# Patient Record
Sex: MALE | Race: ASIAN | NOT HISPANIC OR LATINO | Employment: FULL TIME | URBAN - METROPOLITAN AREA
[De-identification: names, ages, dates, MRNs, and addresses within clinical notes are randomized per-mention and may not be internally consistent; named-entity substitution may affect disease eponyms.]

---

## 2019-03-11 ENCOUNTER — HOSPITAL ENCOUNTER (INPATIENT)
Facility: HOSPITAL | Age: 44
LOS: 4 days | Discharge: HOME | DRG: 438 | End: 2019-03-15
Attending: EMERGENCY MEDICINE | Admitting: HOSPITALIST
Payer: COMMERCIAL

## 2019-03-11 DIAGNOSIS — K85.90 ACUTE PANCREATITIS, UNSPECIFIED COMPLICATION STATUS, UNSPECIFIED PANCREATITIS TYPE: Primary | ICD-10-CM

## 2019-03-11 DIAGNOSIS — R74.01 TRANSAMINITIS: ICD-10-CM

## 2019-03-11 DIAGNOSIS — E78.2 MIXED HYPERLIPIDEMIA: ICD-10-CM

## 2019-03-11 DIAGNOSIS — E86.9 VOLUME DEPLETION: ICD-10-CM

## 2019-03-11 DIAGNOSIS — R73.9 HYPERGLYCEMIA: ICD-10-CM

## 2019-03-11 DIAGNOSIS — E11.9 NEW ONSET TYPE 2 DIABETES MELLITUS: ICD-10-CM

## 2019-03-11 DIAGNOSIS — K76.0 HEPATIC STEATOSIS: ICD-10-CM

## 2019-03-11 DIAGNOSIS — R11.0 NAUSEA: ICD-10-CM

## 2019-03-11 DIAGNOSIS — N17.9 AKI (ACUTE KIDNEY INJURY): ICD-10-CM

## 2019-03-11 DIAGNOSIS — R10.10 UPPER ABDOMINAL PAIN: ICD-10-CM

## 2019-03-11 DIAGNOSIS — E11.00 UNCONTROLLED TYPE 2 DIABETES MELLITUS WITH HYPEROSMOLAR NONKETOTIC HYPERGLYCEMIA: ICD-10-CM

## 2019-03-11 DIAGNOSIS — K85.80 OTHER ACUTE PANCREATITIS WITHOUT INFECTION OR NECROSIS: ICD-10-CM

## 2019-03-11 DIAGNOSIS — R10.9 ABDOMINAL PAIN, UNSPECIFIED ABDOMINAL LOCATION: ICD-10-CM

## 2019-03-11 LAB
ALBUMIN SERPL BCP-MCNC: 3.5 G/DL
ALLENS TEST: ABNORMAL
ALP SERPL-CCNC: 93 U/L
ALT SERPL W/O P-5'-P-CCNC: 44 U/L
ANION GAP SERPL CALC-SCNC: 12 MMOL/L
AST SERPL-CCNC: 14 U/L
B-OH-BUTYR BLD STRIP-SCNC: 0.3 MMOL/L
BACTERIA #/AREA URNS AUTO: NORMAL /HPF
BASOPHILS # BLD AUTO: 0.03 K/UL
BASOPHILS NFR BLD: 0.3 %
BILIRUB SERPL-MCNC: 0.9 MG/DL
BILIRUB UR QL STRIP: NEGATIVE
BUN SERPL-MCNC: 7 MG/DL
CALCIUM SERPL-MCNC: 9.7 MG/DL
CHLORIDE SERPL-SCNC: 94 MMOL/L
CLARITY UR REFRACT.AUTO: CLEAR
CO2 SERPL-SCNC: 23 MMOL/L
COLOR UR AUTO: COLORLESS
CREAT SERPL-MCNC: 1.5 MG/DL
DELSYS: ABNORMAL
DIFFERENTIAL METHOD: ABNORMAL
EOSINOPHIL # BLD AUTO: 0.1 K/UL
EOSINOPHIL NFR BLD: 0.5 %
ERYTHROCYTE [DISTWIDTH] IN BLOOD BY AUTOMATED COUNT: 13.2 %
EST. GFR  (AFRICAN AMERICAN): >60 ML/MIN/1.73 M^2
EST. GFR  (NON AFRICAN AMERICAN): 56.2 ML/MIN/1.73 M^2
ESTIMATED AVG GLUCOSE: 260 MG/DL
GLUCOSE SERPL-MCNC: 279 MG/DL (ref 70–110)
GLUCOSE SERPL-MCNC: 704 MG/DL
GLUCOSE UR QL STRIP: ABNORMAL
HBA1C MFR BLD HPLC: 10.7 %
HCO3 UR-SCNC: 21.7 MMOL/L (ref 24–28)
HCT VFR BLD AUTO: 46.1 %
HGB BLD-MCNC: 15.1 G/DL
HGB UR QL STRIP: ABNORMAL
IMM GRANULOCYTES # BLD AUTO: 0.07 K/UL
IMM GRANULOCYTES NFR BLD AUTO: 0.7 %
KETONES UR QL STRIP: NEGATIVE
LEUKOCYTE ESTERASE UR QL STRIP: NEGATIVE
LIPASE SERPL-CCNC: 26 U/L
LYMPHOCYTES # BLD AUTO: 1.1 K/UL
LYMPHOCYTES NFR BLD: 11.3 %
MCH RBC QN AUTO: 28.4 PG
MCHC RBC AUTO-ENTMCNC: 32.8 G/DL
MCV RBC AUTO: 87 FL
MICROSCOPIC COMMENT: NORMAL
MODE: ABNORMAL
MONOCYTES # BLD AUTO: 0.8 K/UL
MONOCYTES NFR BLD: 8.3 %
NEUTROPHILS # BLD AUTO: 7.9 K/UL
NEUTROPHILS NFR BLD: 78.9 %
NITRITE UR QL STRIP: NEGATIVE
NRBC BLD-RTO: 0 /100 WBC
OSMOLALITY SERPL: 310 MOSM/KG
OSMOLALITY UR: 286 MOSM/KG
PCO2 BLDA: 32 MMHG (ref 35–45)
PH SMN: 7.44 [PH] (ref 7.35–7.45)
PH UR STRIP: 7 [PH] (ref 5–8)
PLATELET # BLD AUTO: 165 K/UL
PMV BLD AUTO: 11.2 FL
PO2 BLDA: 41 MMHG (ref 40–60)
POC BE: -2 MMOL/L
POC SATURATED O2: 79 % (ref 95–100)
POC TCO2: 23 MMOL/L (ref 24–29)
POTASSIUM SERPL-SCNC: 3.9 MMOL/L
PROT SERPL-MCNC: 8.1 G/DL
PROT UR QL STRIP: NEGATIVE
RBC # BLD AUTO: 5.31 M/UL
RBC #/AREA URNS AUTO: 3 /HPF (ref 0–4)
SAMPLE: ABNORMAL
SITE: ABNORMAL
SODIUM SERPL-SCNC: 129 MMOL/L
SP GR UR STRIP: 1.01 (ref 1–1.03)
URN SPEC COLLECT METH UR: ABNORMAL
WBC # BLD AUTO: 9.99 K/UL
YEAST UR QL AUTO: NORMAL

## 2019-03-11 PROCEDURE — 83930 ASSAY OF BLOOD OSMOLALITY: CPT

## 2019-03-11 PROCEDURE — 99291 CRITICAL CARE FIRST HOUR: CPT | Mod: ,,, | Performed by: EMERGENCY MEDICINE

## 2019-03-11 PROCEDURE — 82010 KETONE BODYS QUAN: CPT

## 2019-03-11 PROCEDURE — 96372 THER/PROPH/DIAG INJ SC/IM: CPT | Mod: 59

## 2019-03-11 PROCEDURE — 96361 HYDRATE IV INFUSION ADD-ON: CPT

## 2019-03-11 PROCEDURE — 99223 1ST HOSP IP/OBS HIGH 75: CPT | Mod: ,,, | Performed by: HOSPITALIST

## 2019-03-11 PROCEDURE — 83935 ASSAY OF URINE OSMOLALITY: CPT

## 2019-03-11 PROCEDURE — 85025 COMPLETE CBC W/AUTO DIFF WBC: CPT

## 2019-03-11 PROCEDURE — 25000003 PHARM REV CODE 250: Performed by: EMERGENCY MEDICINE

## 2019-03-11 PROCEDURE — 63600175 PHARM REV CODE 636 W HCPCS: Performed by: EMERGENCY MEDICINE

## 2019-03-11 PROCEDURE — 96374 THER/PROPH/DIAG INJ IV PUSH: CPT

## 2019-03-11 PROCEDURE — 82803 BLOOD GASES ANY COMBINATION: CPT

## 2019-03-11 PROCEDURE — 80053 COMPREHEN METABOLIC PANEL: CPT

## 2019-03-11 PROCEDURE — 84100 ASSAY OF PHOSPHORUS: CPT

## 2019-03-11 PROCEDURE — 12000002 HC ACUTE/MED SURGE SEMI-PRIVATE ROOM

## 2019-03-11 PROCEDURE — 99291 PR CRITICAL CARE, E/M 30-74 MINUTES: ICD-10-PCS | Mod: ,,, | Performed by: EMERGENCY MEDICINE

## 2019-03-11 PROCEDURE — 99900035 HC TECH TIME PER 15 MIN (STAT)

## 2019-03-11 PROCEDURE — 96375 TX/PRO/DX INJ NEW DRUG ADDON: CPT

## 2019-03-11 PROCEDURE — 99285 EMERGENCY DEPT VISIT HI MDM: CPT

## 2019-03-11 PROCEDURE — 25500020 PHARM REV CODE 255: Performed by: EMERGENCY MEDICINE

## 2019-03-11 PROCEDURE — 83036 HEMOGLOBIN GLYCOSYLATED A1C: CPT

## 2019-03-11 PROCEDURE — 81001 URINALYSIS AUTO W/SCOPE: CPT

## 2019-03-11 PROCEDURE — 96376 TX/PRO/DX INJ SAME DRUG ADON: CPT

## 2019-03-11 PROCEDURE — 83735 ASSAY OF MAGNESIUM: CPT

## 2019-03-11 PROCEDURE — 99291 CRITICAL CARE FIRST HOUR: CPT

## 2019-03-11 PROCEDURE — 83690 ASSAY OF LIPASE: CPT

## 2019-03-11 PROCEDURE — 99223 PR INITIAL HOSPITAL CARE,LEVL III: ICD-10-PCS | Mod: ,,, | Performed by: HOSPITALIST

## 2019-03-11 RX ORDER — AMOXICILLIN 250 MG
2 CAPSULE ORAL 2 TIMES DAILY PRN
Status: DISCONTINUED | OUTPATIENT
Start: 2019-03-12 | End: 2019-03-15 | Stop reason: HOSPADM

## 2019-03-11 RX ORDER — HYDROMORPHONE HYDROCHLORIDE 1 MG/ML
1 INJECTION, SOLUTION INTRAMUSCULAR; INTRAVENOUS; SUBCUTANEOUS
Status: COMPLETED | OUTPATIENT
Start: 2019-03-11 | End: 2019-03-11

## 2019-03-11 RX ORDER — ONDANSETRON 2 MG/ML
4 INJECTION INTRAMUSCULAR; INTRAVENOUS EVERY 6 HOURS PRN
Status: DISCONTINUED | OUTPATIENT
Start: 2019-03-12 | End: 2019-03-14

## 2019-03-11 RX ORDER — MORPHINE SULFATE 4 MG/ML
4 INJECTION, SOLUTION INTRAMUSCULAR; INTRAVENOUS EVERY 4 HOURS PRN
Status: DISCONTINUED | OUTPATIENT
Start: 2019-03-12 | End: 2019-03-12

## 2019-03-11 RX ORDER — POTASSIUM CHLORIDE 20 MEQ/1
20 TABLET, EXTENDED RELEASE ORAL ONCE
Status: DISCONTINUED | OUTPATIENT
Start: 2019-03-12 | End: 2019-03-11

## 2019-03-11 RX ORDER — ACETAMINOPHEN 325 MG/1
650 TABLET ORAL EVERY 6 HOURS PRN
Status: DISCONTINUED | OUTPATIENT
Start: 2019-03-12 | End: 2019-03-15 | Stop reason: HOSPADM

## 2019-03-11 RX ORDER — ONDANSETRON 2 MG/ML
4 INJECTION INTRAMUSCULAR; INTRAVENOUS
Status: COMPLETED | OUTPATIENT
Start: 2019-03-11 | End: 2019-03-11

## 2019-03-11 RX ORDER — METOCLOPRAMIDE HYDROCHLORIDE 5 MG/ML
10 INJECTION INTRAMUSCULAR; INTRAVENOUS
Status: COMPLETED | OUTPATIENT
Start: 2019-03-11 | End: 2019-03-11

## 2019-03-11 RX ORDER — OXYCODONE HYDROCHLORIDE 5 MG/1
5 TABLET ORAL EVERY 4 HOURS PRN
Status: DISCONTINUED | OUTPATIENT
Start: 2019-03-12 | End: 2019-03-12

## 2019-03-11 RX ORDER — POTASSIUM CHLORIDE 20 MEQ/1
40 TABLET, EXTENDED RELEASE ORAL ONCE
Status: COMPLETED | OUTPATIENT
Start: 2019-03-12 | End: 2019-03-12

## 2019-03-11 RX ADMIN — HYDROMORPHONE HYDROCHLORIDE 1 MG: 1 INJECTION, SOLUTION INTRAMUSCULAR; INTRAVENOUS; SUBCUTANEOUS at 08:03

## 2019-03-11 RX ADMIN — HYDROMORPHONE HYDROCHLORIDE 1 MG: 1 INJECTION, SOLUTION INTRAMUSCULAR; INTRAVENOUS; SUBCUTANEOUS at 10:03

## 2019-03-11 RX ADMIN — ONDANSETRON 4 MG: 2 INJECTION INTRAMUSCULAR; INTRAVENOUS at 08:03

## 2019-03-11 RX ADMIN — INSULIN HUMAN 5 UNITS: 100 INJECTION, SOLUTION PARENTERAL at 10:03

## 2019-03-11 RX ADMIN — SODIUM CHLORIDE, SODIUM LACTATE, POTASSIUM CHLORIDE, AND CALCIUM CHLORIDE 1000 ML: .6; .31; .03; .02 INJECTION, SOLUTION INTRAVENOUS at 08:03

## 2019-03-11 RX ADMIN — IOHEXOL 100 ML: 350 INJECTION, SOLUTION INTRAVENOUS at 10:03

## 2019-03-11 RX ADMIN — SODIUM CHLORIDE, SODIUM LACTATE, POTASSIUM CHLORIDE, AND CALCIUM CHLORIDE 1000 ML: .6; .31; .03; .02 INJECTION, SOLUTION INTRAVENOUS at 10:03

## 2019-03-11 RX ADMIN — METOCLOPRAMIDE 10 MG: 5 INJECTION, SOLUTION INTRAMUSCULAR; INTRAVENOUS at 10:03

## 2019-03-12 PROBLEM — R11.0 NAUSEA: Status: ACTIVE | Noted: 2019-03-12

## 2019-03-12 PROBLEM — R10.10 UPPER ABDOMINAL PAIN: Status: ACTIVE | Noted: 2019-03-12

## 2019-03-12 PROBLEM — E11.9 NEW ONSET TYPE 2 DIABETES MELLITUS: Status: ACTIVE | Noted: 2019-03-12

## 2019-03-12 PROBLEM — E78.2 MIXED HYPERLIPIDEMIA: Status: ACTIVE | Noted: 2019-03-12

## 2019-03-12 PROBLEM — E11.00 UNCONTROLLED TYPE 2 DIABETES MELLITUS WITH HYPEROSMOLAR NONKETOTIC HYPERGLYCEMIA: Status: ACTIVE | Noted: 2019-03-12

## 2019-03-12 PROBLEM — E86.9 VOLUME DEPLETION: Status: ACTIVE | Noted: 2019-03-12

## 2019-03-12 PROBLEM — K85.90 ACUTE PANCREATITIS WITHOUT INFECTION OR NECROSIS: Status: ACTIVE | Noted: 2019-03-12

## 2019-03-12 PROBLEM — K76.0 HEPATIC STEATOSIS: Status: ACTIVE | Noted: 2019-03-12

## 2019-03-12 PROBLEM — N17.9 AKI (ACUTE KIDNEY INJURY): Status: ACTIVE | Noted: 2019-03-12

## 2019-03-12 LAB
ALBUMIN SERPL BCP-MCNC: 2.9 G/DL
ALP SERPL-CCNC: 87 U/L
ALT SERPL W/O P-5'-P-CCNC: 32 U/L
ANION GAP SERPL CALC-SCNC: 8 MMOL/L
AST SERPL-CCNC: 19 U/L
BASOPHILS # BLD AUTO: 0.02 K/UL
BASOPHILS NFR BLD: 0.2 %
BILIRUB SERPL-MCNC: 0.9 MG/DL
BUN SERPL-MCNC: 5 MG/DL
CALCIUM SERPL-MCNC: 8.5 MG/DL
CHLORIDE SERPL-SCNC: 104 MMOL/L
CHOLEST SERPL-MCNC: 250 MG/DL
CHOLEST/HDLC SERPL: 10.4 {RATIO}
CO2 SERPL-SCNC: 24 MMOL/L
CREAT SERPL-MCNC: 0.8 MG/DL
DIFFERENTIAL METHOD: ABNORMAL
EOSINOPHIL # BLD AUTO: 0.1 K/UL
EOSINOPHIL NFR BLD: 1.1 %
ERYTHROCYTE [DISTWIDTH] IN BLOOD BY AUTOMATED COUNT: 13.2 %
EST. GFR  (AFRICAN AMERICAN): >60 ML/MIN/1.73 M^2
EST. GFR  (NON AFRICAN AMERICAN): >60 ML/MIN/1.73 M^2
GLUCOSE SERPL-MCNC: 197 MG/DL
GLUCOSE SERPL-MCNC: 249 MG/DL (ref 70–110)
HCT VFR BLD AUTO: 40 %
HDLC SERPL-MCNC: 24 MG/DL
HDLC SERPL: 9.6 %
HGB BLD-MCNC: 13.4 G/DL
IMM GRANULOCYTES # BLD AUTO: 0.04 K/UL
IMM GRANULOCYTES NFR BLD AUTO: 0.4 %
LDLC SERPL CALC-MCNC: ABNORMAL MG/DL
LIPASE SERPL-CCNC: 20 U/L
LYMPHOCYTES # BLD AUTO: 1.7 K/UL
LYMPHOCYTES NFR BLD: 19.1 %
MAGNESIUM SERPL-MCNC: 2 MG/DL
MAGNESIUM SERPL-MCNC: 2.5 MG/DL
MCH RBC QN AUTO: 29.2 PG
MCHC RBC AUTO-ENTMCNC: 33.5 G/DL
MCV RBC AUTO: 87 FL
MONOCYTES # BLD AUTO: 0.8 K/UL
MONOCYTES NFR BLD: 9.1 %
NEUTROPHILS # BLD AUTO: 6.3 K/UL
NEUTROPHILS NFR BLD: 70.1 %
NONHDLC SERPL-MCNC: 226 MG/DL
NRBC BLD-RTO: 0 /100 WBC
PHOSPHATE SERPL-MCNC: 2.2 MG/DL
PHOSPHATE SERPL-MCNC: 2.2 MG/DL
PLATELET # BLD AUTO: 150 K/UL
PMV BLD AUTO: 11.7 FL
POCT GLUCOSE: 206 MG/DL (ref 70–110)
POCT GLUCOSE: 245 MG/DL (ref 70–110)
POCT GLUCOSE: 246 MG/DL (ref 70–110)
POCT GLUCOSE: 255 MG/DL (ref 70–110)
POCT GLUCOSE: 279 MG/DL (ref 70–110)
POTASSIUM SERPL-SCNC: 3.2 MMOL/L
PROT SERPL-MCNC: 6.6 G/DL
RBC # BLD AUTO: 4.59 M/UL
SODIUM SERPL-SCNC: 136 MMOL/L
TRIGL SERPL-MCNC: 449 MG/DL
TRIGL SERPL-MCNC: 449 MG/DL
WBC # BLD AUTO: 8.97 K/UL

## 2019-03-12 PROCEDURE — 80061 LIPID PANEL: CPT

## 2019-03-12 PROCEDURE — 25000003 PHARM REV CODE 250: Performed by: HOSPITALIST

## 2019-03-12 PROCEDURE — 63600175 PHARM REV CODE 636 W HCPCS: Performed by: HOSPITALIST

## 2019-03-12 PROCEDURE — 80053 COMPREHEN METABOLIC PANEL: CPT

## 2019-03-12 PROCEDURE — 99232 SBSQ HOSP IP/OBS MODERATE 35: CPT | Mod: ,,, | Performed by: HOSPITALIST

## 2019-03-12 PROCEDURE — 11000001 HC ACUTE MED/SURG PRIVATE ROOM

## 2019-03-12 PROCEDURE — 86337 INSULIN ANTIBODIES: CPT

## 2019-03-12 PROCEDURE — 83690 ASSAY OF LIPASE: CPT

## 2019-03-12 PROCEDURE — 85025 COMPLETE CBC W/AUTO DIFF WBC: CPT

## 2019-03-12 PROCEDURE — 86341 ISLET CELL ANTIBODY: CPT

## 2019-03-12 PROCEDURE — S5571 INSULIN DISPOS PEN 3 ML: HCPCS | Performed by: HOSPITALIST

## 2019-03-12 PROCEDURE — 25000003 PHARM REV CODE 250: Performed by: EMERGENCY MEDICINE

## 2019-03-12 PROCEDURE — 83735 ASSAY OF MAGNESIUM: CPT

## 2019-03-12 PROCEDURE — 99232 PR SUBSEQUENT HOSPITAL CARE,LEVL II: ICD-10-PCS | Mod: ,,, | Performed by: HOSPITALIST

## 2019-03-12 PROCEDURE — 84100 ASSAY OF PHOSPHORUS: CPT

## 2019-03-12 RX ORDER — INSULIN ASPART 100 [IU]/ML
1-10 INJECTION, SOLUTION INTRAVENOUS; SUBCUTANEOUS EVERY 6 HOURS PRN
Status: DISCONTINUED | OUTPATIENT
Start: 2019-03-12 | End: 2019-03-15 | Stop reason: HOSPADM

## 2019-03-12 RX ORDER — HYDROMORPHONE HYDROCHLORIDE 1 MG/ML
1 INJECTION, SOLUTION INTRAMUSCULAR; INTRAVENOUS; SUBCUTANEOUS
Status: DISCONTINUED | OUTPATIENT
Start: 2019-03-12 | End: 2019-03-12

## 2019-03-12 RX ORDER — ATORVASTATIN CALCIUM 20 MG/1
40 TABLET, FILM COATED ORAL DAILY
Status: DISCONTINUED | OUTPATIENT
Start: 2019-03-12 | End: 2019-03-14

## 2019-03-12 RX ORDER — ENOXAPARIN SODIUM 100 MG/ML
40 INJECTION SUBCUTANEOUS
Status: DISCONTINUED | OUTPATIENT
Start: 2019-03-12 | End: 2019-03-15 | Stop reason: HOSPADM

## 2019-03-12 RX ORDER — HYDROMORPHONE HYDROCHLORIDE 1 MG/ML
1 INJECTION, SOLUTION INTRAMUSCULAR; INTRAVENOUS; SUBCUTANEOUS EVERY 4 HOURS PRN
Status: DISCONTINUED | OUTPATIENT
Start: 2019-03-12 | End: 2019-03-14

## 2019-03-12 RX ORDER — ALLOPURINOL 100 MG/1
100 TABLET ORAL DAILY
Status: DISCONTINUED | OUTPATIENT
Start: 2019-03-12 | End: 2019-03-15 | Stop reason: HOSPADM

## 2019-03-12 RX ORDER — HYDROMORPHONE HYDROCHLORIDE 1 MG/ML
2 INJECTION, SOLUTION INTRAMUSCULAR; INTRAVENOUS; SUBCUTANEOUS EVERY 4 HOURS PRN
Status: DISCONTINUED | OUTPATIENT
Start: 2019-03-12 | End: 2019-03-14

## 2019-03-12 RX ORDER — SODIUM CHLORIDE 0.9 % (FLUSH) 0.9 %
5 SYRINGE (ML) INJECTION
Status: DISCONTINUED | OUTPATIENT
Start: 2019-03-12 | End: 2019-03-15 | Stop reason: HOSPADM

## 2019-03-12 RX ORDER — SODIUM CHLORIDE, SODIUM LACTATE, POTASSIUM CHLORIDE, CALCIUM CHLORIDE 600; 310; 30; 20 MG/100ML; MG/100ML; MG/100ML; MG/100ML
INJECTION, SOLUTION INTRAVENOUS CONTINUOUS
Status: DISCONTINUED | OUTPATIENT
Start: 2019-03-12 | End: 2019-03-13

## 2019-03-12 RX ORDER — HYDROMORPHONE HYDROCHLORIDE 1 MG/ML
1 INJECTION, SOLUTION INTRAMUSCULAR; INTRAVENOUS; SUBCUTANEOUS
Status: COMPLETED | OUTPATIENT
Start: 2019-03-12 | End: 2019-03-12

## 2019-03-12 RX ORDER — SODIUM CHLORIDE 9 MG/ML
INJECTION, SOLUTION INTRAVENOUS CONTINUOUS
Status: DISCONTINUED | OUTPATIENT
Start: 2019-03-12 | End: 2019-03-12

## 2019-03-12 RX ORDER — ONDANSETRON 4 MG/1
4 TABLET, ORALLY DISINTEGRATING ORAL EVERY 8 HOURS PRN
Status: DISCONTINUED | OUTPATIENT
Start: 2019-03-12 | End: 2019-03-15 | Stop reason: HOSPADM

## 2019-03-12 RX ORDER — RAMELTEON 8 MG/1
8 TABLET ORAL NIGHTLY PRN
Status: DISCONTINUED | OUTPATIENT
Start: 2019-03-12 | End: 2019-03-15 | Stop reason: HOSPADM

## 2019-03-12 RX ORDER — GLUCAGON 1 MG
1 KIT INJECTION
Status: DISCONTINUED | OUTPATIENT
Start: 2019-03-12 | End: 2019-03-15 | Stop reason: HOSPADM

## 2019-03-12 RX ORDER — HYDROMORPHONE HYDROCHLORIDE 1 MG/ML
1 INJECTION, SOLUTION INTRAMUSCULAR; INTRAVENOUS; SUBCUTANEOUS EVERY 4 HOURS PRN
Status: DISCONTINUED | OUTPATIENT
Start: 2019-03-12 | End: 2019-03-12

## 2019-03-12 RX ORDER — POLYETHYLENE GLYCOL 3350 17 G/17G
17 POWDER, FOR SOLUTION ORAL 2 TIMES DAILY PRN
Status: DISCONTINUED | OUTPATIENT
Start: 2019-03-12 | End: 2019-03-15 | Stop reason: HOSPADM

## 2019-03-12 RX ORDER — HYDROMORPHONE HYDROCHLORIDE 1 MG/ML
0.5 INJECTION, SOLUTION INTRAMUSCULAR; INTRAVENOUS; SUBCUTANEOUS EVERY 4 HOURS PRN
Status: DISCONTINUED | OUTPATIENT
Start: 2019-03-12 | End: 2019-03-12

## 2019-03-12 RX ORDER — ONDANSETRON 2 MG/ML
4 INJECTION INTRAMUSCULAR; INTRAVENOUS EVERY 8 HOURS PRN
Status: DISCONTINUED | OUTPATIENT
Start: 2019-03-12 | End: 2019-03-15 | Stop reason: HOSPADM

## 2019-03-12 RX ADMIN — ENOXAPARIN SODIUM 40 MG: 100 INJECTION SUBCUTANEOUS at 05:03

## 2019-03-12 RX ADMIN — SODIUM CHLORIDE, SODIUM LACTATE, POTASSIUM CHLORIDE, AND CALCIUM CHLORIDE: .6; .31; .03; .02 INJECTION, SOLUTION INTRAVENOUS at 03:03

## 2019-03-12 RX ADMIN — ATORVASTATIN CALCIUM 40 MG: 10 TABLET, FILM COATED ORAL at 11:03

## 2019-03-12 RX ADMIN — MORPHINE SULFATE 4 MG: 4 INJECTION INTRAVENOUS at 01:03

## 2019-03-12 RX ADMIN — HYDROMORPHONE HYDROCHLORIDE 0.5 MG: 1 INJECTION, SOLUTION INTRAMUSCULAR; INTRAVENOUS; SUBCUTANEOUS at 06:03

## 2019-03-12 RX ADMIN — SODIUM CHLORIDE: 0.9 INJECTION, SOLUTION INTRAVENOUS at 01:03

## 2019-03-12 RX ADMIN — POTASSIUM CHLORIDE 40 MEQ: 1500 TABLET, EXTENDED RELEASE ORAL at 12:03

## 2019-03-12 RX ADMIN — HYDROMORPHONE HYDROCHLORIDE 2 MG: 1 INJECTION, SOLUTION INTRAMUSCULAR; INTRAVENOUS; SUBCUTANEOUS at 08:03

## 2019-03-12 RX ADMIN — ALLOPURINOL 100 MG: 100 TABLET ORAL at 08:03

## 2019-03-12 RX ADMIN — HYDROMORPHONE HYDROCHLORIDE 2 MG: 1 INJECTION, SOLUTION INTRAMUSCULAR; INTRAVENOUS; SUBCUTANEOUS at 03:03

## 2019-03-12 RX ADMIN — INSULIN DETEMIR 15 UNITS: 100 INJECTION, SOLUTION SUBCUTANEOUS at 12:03

## 2019-03-12 RX ADMIN — INSULIN ASPART 4 UNITS: 100 INJECTION, SOLUTION INTRAVENOUS; SUBCUTANEOUS at 11:03

## 2019-03-12 RX ADMIN — SODIUM CHLORIDE: 0.9 INJECTION, SOLUTION INTRAVENOUS at 06:03

## 2019-03-12 RX ADMIN — HYDROMORPHONE HYDROCHLORIDE 1 MG: 1 INJECTION, SOLUTION INTRAMUSCULAR; INTRAVENOUS; SUBCUTANEOUS at 11:03

## 2019-03-12 RX ADMIN — SODIUM CHLORIDE, SODIUM LACTATE, POTASSIUM CHLORIDE, AND CALCIUM CHLORIDE 1000 ML: .6; .31; .03; .02 INJECTION, SOLUTION INTRAVENOUS at 12:03

## 2019-03-12 RX ADMIN — INSULIN DETEMIR 15 UNITS: 100 INJECTION, SOLUTION SUBCUTANEOUS at 08:03

## 2019-03-12 RX ADMIN — HYDROMORPHONE HYDROCHLORIDE 1 MG: 1 INJECTION, SOLUTION INTRAMUSCULAR; INTRAVENOUS; SUBCUTANEOUS at 03:03

## 2019-03-12 NOTE — PROVIDER PROGRESS NOTES - EMERGENCY DEPT.
Encounter Date: 3/11/2019    ED Physician Progress Notes        Physician Note:   Time patient was seen by the provider: 8:22 PM      The patient is a 43 y.o. male who presents to the ED with a complaint of epigastric abdominal pain radiating to left back, worsened over last few days. Patient was diagnosed with pancreatitis at an ER in [place] 2 days ago.     Will do a CT. Move him to main ER. He will need to be NPO. Will control pain with dilaudid.       I, Gina Garcia, am scribing for, and in the presence of, Dr. Monsivais. I performed the above scribed service and the documentation accurately describes the services I performed. I attest to the accuracy of the note.

## 2019-03-12 NOTE — ED NOTES
Rounding  There is no change in patient status at this time and is resting in bed with side rails up, moderate pain noted, MD aware, medications given.  Patient denies chest pain and shortness of breath. Respirations are even and unlabored with equal chest rise and fall. Patient turned onto right side. Patient given plan of care update.   All IV Sites are of normal skin temperature and are free from any pain, redness, swelling at this time. Pulses are palpable distal to all sites with normal skin temperature and sensation noted for patients current condition and situation.  Safety Check  Bed locked and low, call bell within reach, side rails up X 2. Necessary monitoring equipment remains properly attached. Belongings remain within patients reach. Patient denies any needs at this time, advised and agrees to call with any needs or changes.

## 2019-03-12 NOTE — ED NOTES
Rounding  Patient complaining of increased pain, MD aware, awaiting orders.  Patient denies chest pain and shortness of breath. Respirations are even and unlabored with equal chest rise and fall. Patient turned onto right side. Patient given plan of care update.   All IV Sites are of normal skin temperature and are free from any pain, redness, swelling at this time. Pulses are palpable distal to all sites with normal skin temperature and sensation noted for patients current condition and situation.  Safety Check  Bed locked and low, call bell within reach, side rails up X 2. Necessary monitoring equipment remains properly attached. Belongings remain within patients reach. Patient denies any needs at this time, advised and agrees to call with any needs or changes.

## 2019-03-12 NOTE — ED NOTES
Rounding  Pain improved. Pt appears to be resting comfortably, asleep at this time.  Respirations are even and unlabored with equal chest rise and fall. Patient turned onto right side. Patient given plan of care update.   All IV Sites are of normal skin temperature and are free from any pain, redness, swelling at this time. Pulses are palpable distal to all sites with normal skin temperature and sensation noted for patients current condition and situation.  Safety Check  Bed locked and low, call bell within reach, side rails up X 2. Necessary monitoring equipment remains properly attached. Belongings remain within patients reach. Patient denies any needs at this time, advised and agrees to call with any needs or changes.

## 2019-03-12 NOTE — H&P
[facility] Hospital Medicine  History & Physical    Patient Name: Elliott Riggins  MRN: [id number]  Admission Date: 3/11/2019  Attending Physician: Chano Altamirano MD   Primary Care Provider: Primary Doctor [facility] Medicine Team: Networked reference to record PCT  Amna Helm DO     Patient information was obtained from patient and ER records.     Subjective:     Principal Problem:Acute pancreatitis without infection or necrosis    Chief Complaint:   Chief Complaint   Patient presents with    Abdominal Pain     c/o abd pain x 1 month that has gotten worse over the last week. Was dx with mild pancreatitis and discharged from an ED in [place] on Saturday.         HPI:     Patient is a 43 year old male with PMH of gout on allopurinol presented to ED with complaint of worsening abdominal pain for last 4 days. States he has been having intermittent indigestion with abdominal discomfort and bloating for last one month. He came to [place], [place] last week from [place] to attend a wedding. But developed severe epigastric, RUQ abdominal pain radiating to back on Friday associated with nausea but no vomiting. He went to ED at [facility] Saturday where CT abdomen reportedly showed pancreatitis. States he was given IVF and he left ED to attend wedding Saturday evening. He drove to [facility] next day to visit family. He continues to have abdominal pain with inability to tolerate PO intake and thus decided to come to ED. Also reports polyuria, polydipsia for last few days. He is found to have HHS with glucose ~700 but not in DKA. CT abdomen showed acute uncomplicated pancreatitis over tail and body area. Denies fever,chills, alcohol intake, tobacco abuse, prior history of pancreatitis or gallstone, diabetes.     His glucose improved to 280 after 3L IVF and insulin 5 units IVP. Abdominal pain improved with dilaudid given earlier but now feels like pain is coming back.           Past Medical History:    Diagnosis Date    Gout     Pancreatitis        Past Surgical History:   Procedure Laterality Date    Right ankle surgery  Right        Review of patient's allergies indicates:   Allergen Reactions    Nsaids (non-steroidal anti-inflammatory drug) Anaphylaxis       No current facility-administered medications on file prior to encounter.      Current Outpatient Medications on File Prior to Encounter   Medication Sig    ALLOPURINOL ORAL Take by mouth.    finasteride (PROSCAR ORAL) Take by mouth.     Family History : Mother with DM    None        Tobacco Use    Smoking status: Never Smoker   Substance and Sexual Activity    Alcohol use: No     Frequency: Never    Drug use: No     Comment: Lives with wife     Sexual activity: Yes     Partners: Female     Review of Systems   Constitutional: Negative for activity change, appetite change, chills, diaphoresis, fatigue, fever and unexpected weight change.   HENT: Negative for congestion, dental problem, drooling, ear discharge, ear pain, facial swelling, hearing loss, mouth sores, nosebleeds, postnasal drip, rhinorrhea, sinus pressure, sneezing, sore throat, tinnitus, trouble swallowing and voice change.    Eyes: Negative for photophobia, pain, discharge, redness, itching and visual disturbance.   Respiratory: Negative for apnea, cough, choking, chest tightness, shortness of breath, wheezing and stridor.    Cardiovascular: Negative for chest pain, palpitations and leg swelling.   Gastrointestinal: Positive for abdominal distention, abdominal pain and nausea. Negative for anal bleeding, blood in stool, constipation, diarrhea, rectal pain and vomiting.   Endocrine: Negative for cold intolerance, heat intolerance, polydipsia, polyphagia and polyuria.   Genitourinary: Negative for decreased urine volume, difficulty urinating, discharge, dysuria, enuresis, flank pain, frequency, genital sores, hematuria, penile pain, penile swelling, scrotal swelling, testicular pain  and urgency.   Musculoskeletal: Negative for arthralgias, back pain, gait problem, joint swelling, myalgias, neck pain and neck stiffness.   Skin: Negative for color change, pallor, rash and wound.   Allergic/Immunologic: Negative for environmental allergies, food allergies and immunocompromised state.   Neurological: Negative for dizziness, tremors, seizures, syncope, facial asymmetry, speech difficulty, weakness, light-headedness, numbness and headaches.   Hematological: Negative for adenopathy. Does not bruise/bleed easily.   Psychiatric/Behavioral: Negative for agitation, behavioral problems, confusion, decreased concentration, dysphoric mood, hallucinations, self-injury, sleep disturbance and suicidal ideas. The patient is not nervous/anxious and is not hyperactive.      Objective:     Vital Signs (Most Recent):  Temp: 98.2 °F (36.8 °C) (03/11/19 1934)  Pulse: 102 (03/11/19 2157)  Resp: 14 (03/11/19 2157)  BP: (!) 140/82 (03/11/19 2300)  SpO2: 97 % (03/11/19 2157) Vital Signs (24h Range):  Temp:  [98.2 °F (36.8 °C)] 98.2 °F (36.8 °C)  Pulse:  [102-117] 102  Resp:  [14-20] 14  SpO2:  [97 %] 97 %  BP: (130-140)/(75-85) 140/82     Weight: 90.7 kg (200 lb)  Body mass index is 31.32 kg/m².    Physical Exam   Constitutional: He is oriented to person, place, and time. He appears well-developed and well-nourished. No distress.   Appears uncomfortable due to abdominal pain    HENT:   Head: Normocephalic and atraumatic.   Nose: Nose normal.   Mouth/Throat: No oropharyngeal exudate.   Dry oral mucosa    Eyes: Conjunctivae and EOM are normal. Pupils are equal, round, and reactive to light. No scleral icterus.   Neck: Normal range of motion. Neck supple. No JVD present. No tracheal deviation present. No thyromegaly present.   Cardiovascular: Normal rate, regular rhythm, normal heart sounds and intact distal pulses.   No murmur heard.  Pulmonary/Chest: Effort normal and breath sounds normal. No respiratory distress. He has  no wheezes. He has no rales. He exhibits no tenderness.   Abdominal: Soft. He exhibits distension (Mild abdominal distention ). He exhibits no mass. There is tenderness (moderate TTP across upper abdomen ). There is no rebound and no guarding.   Decreased bowel sounds    Musculoskeletal: Normal range of motion. He exhibits no edema or tenderness.   Old surgical scar over R ankle.    Lymphadenopathy:     He has no cervical adenopathy.   Neurological: He is alert and oriented to person, place, and time. He has normal reflexes. He displays normal reflexes. No cranial nerve deficit. He exhibits normal muscle tone. Coordination normal.   Skin: Skin is warm and dry. No rash noted. He is not diaphoretic. No erythema.   Psychiatric: He has a normal mood and affect. Judgment and thought content normal.         CRANIAL NERVES     CN III, IV, VI   Pupils are equal, round, and reactive to light.  Extraocular motions are normal.       Significant Labs:  Recent Results (from the past 24 hour(s))   CBC W/ AUTO DIFFERENTIAL    Collection Time: 03/11/19  8:25 PM   Result Value Ref Range    WBC 9.99 3.90 - 12.70 K/uL    RBC 5.31 4.60 - 6.20 M/uL    Hemoglobin 15.1 14.0 - 18.0 g/dL    Hematocrit 46.1 40.0 - 54.0 %    MCV 87 82 - 98 fL    MCH 28.4 27.0 - 31.0 pg    MCHC 32.8 32.0 - 36.0 g/dL    RDW 13.2 11.5 - 14.5 %    Platelets 165 150 - 350 K/uL    MPV 11.2 9.2 - 12.9 fL    Immature Granulocytes 0.7 (H) 0.0 - 0.5 %    Gran # (ANC) 7.9 (H) 1.8 - 7.7 K/uL    Immature Grans (Abs) 0.07 (H) 0.00 - 0.04 K/uL    Lymph # 1.1 1.0 - 4.8 K/uL    Mono # 0.8 0.3 - 1.0 K/uL    Eos # 0.1 0.0 - 0.5 K/uL    Baso # 0.03 0.00 - 0.20 K/uL    nRBC 0 0 /100 WBC    Gran% 78.9 (H) 38.0 - 73.0 %    Lymph% 11.3 (L) 18.0 - 48.0 %    Mono% 8.3 4.0 - 15.0 %    Eosinophil% 0.5 0.0 - 8.0 %    Basophil% 0.3 0.0 - 1.9 %    Differential Method Automated    Comp. Metabolic Panel    Collection Time: 03/11/19  8:25 PM   Result Value Ref Range    Sodium 129 (L) 136 -  145 mmol/L    Potassium 3.9 3.5 - 5.1 mmol/L    Chloride 94 (L) 95 - 110 mmol/L    CO2 23 23 - 29 mmol/L    Glucose 704 (HH) 70 - 110 mg/dL    BUN, Bld 7 6 - 20 mg/dL    Creatinine 1.5 (H) 0.5 - 1.4 mg/dL    Calcium 9.7 8.7 - 10.5 mg/dL    Total Protein 8.1 6.0 - 8.4 g/dL    Albumin 3.5 3.5 - 5.2 g/dL    Total Bilirubin 0.9 0.1 - 1.0 mg/dL    Alkaline Phosphatase 93 55 - 135 U/L    AST 14 10 - 40 U/L    ALT 44 10 - 44 U/L    Anion Gap 12 8 - 16 mmol/L    eGFR if African American >60.0 >60 mL/min/1.73 m^2    eGFR if non  56.2 (A) >60 mL/min/1.73 m^2   Lipase    Collection Time: 03/11/19  8:25 PM   Result Value Ref Range    Lipase 26 4 - 60 U/L   Urinalysis, Reflex to Urine Culture Urine, Clean Catch    Collection Time: 03/11/19  8:25 PM   Result Value Ref Range    Specimen UA Urine, Clean Catch     Color, UA Colorless (A) Yellow, Straw, Jeanne    Appearance, UA Clear Clear    pH, UA 7.0 5.0 - 8.0    Specific Gravity, UA 1.010 1.005 - 1.030    Protein, UA Negative Negative    Glucose, UA 3+ (A) Negative    Ketones, UA Negative Negative    Bilirubin (UA) Negative Negative    Occult Blood UA 2+ (A) Negative    Nitrite, UA Negative Negative    Leukocytes, UA Negative Negative   Urinalysis Microscopic    Collection Time: 03/11/19  8:25 PM   Result Value Ref Range    RBC, UA 3 0 - 4 /hpf    Bacteria, UA None None-Occ /hpf    Yeast, UA None None    Microscopic Comment SEE COMMENT    Hemoglobin A1c    Collection Time: 03/11/19  8:25 PM   Result Value Ref Range    Hemoglobin A1C 10.7 (H) 4.0 - 5.6 %    Estimated Avg Glucose 260 (H) 68 - 131 mg/dL   Osmolality, urine    Collection Time: 03/11/19  8:25 PM   Result Value Ref Range    Osmolality, Ur 286 50 - 1200 mOsm/kg   Osmolality, Serum    Collection Time: 03/11/19  8:25 PM   Result Value Ref Range    Osmolality 310 (H) 280 - 300 mOsm/kg   Magnesium    Collection Time: 03/11/19  8:25 PM   Result Value Ref Range    Magnesium 2.5 1.6 - 2.6 mg/dL   Phosphorus     Collection Time: 03/11/19  8:25 PM   Result Value Ref Range    Phosphorus 2.2 (L) 2.7 - 4.5 mg/dL   ISTAT PROCEDURE    Collection Time: 03/11/19  9:50 PM   Result Value Ref Range    POC PH 7.439 7.35 - 7.45    POC PCO2 32.0 (L) 35 - 45 mmHg    POC PO2 41 40 - 60 mmHg    POC HCO3 21.7 (L) 24 - 28 mmol/L    POC BE -2 -2 to 2 mmol/L    POC SATURATED O2 79 (L) 95 - 100 %    POC TCO2 23 (L) 24 - 29 mmol/L    Sample VENOUS     Site Other     Allens Test N/A     DelSys Room Air     Mode SPONT    Beta - Hydroxybutyrate, Serum    Collection Time: 03/11/19  9:52 PM   Result Value Ref Range    Beta-Hydroxybutyrate 0.3 0.0 - 0.5 mmol/L   POCT Glucose, Hand-Held Device    Collection Time: 03/11/19 11:25 PM   Result Value Ref Range    POC Glucose 279 (A) 70 - 110 MG/DL   POCT Glucose, Hand-Held Device    Collection Time: 03/12/19  3:00 AM   Result Value Ref Range    POC Glucose 249 (A) 70 - 110 MG/DL   CBC auto differential    Collection Time: 03/12/19  4:20 AM   Result Value Ref Range    WBC 8.97 3.90 - 12.70 K/uL    RBC 4.59 (L) 4.60 - 6.20 M/uL    Hemoglobin 13.4 (L) 14.0 - 18.0 g/dL    Hematocrit 40.0 40.0 - 54.0 %    MCV 87 82 - 98 fL    MCH 29.2 27.0 - 31.0 pg    MCHC 33.5 32.0 - 36.0 g/dL    RDW 13.2 11.5 - 14.5 %    Platelets 150 150 - 350 K/uL    MPV 11.7 9.2 - 12.9 fL    Immature Granulocytes 0.4 0.0 - 0.5 %    Gran # (ANC) 6.3 1.8 - 7.7 K/uL    Immature Grans (Abs) 0.04 0.00 - 0.04 K/uL    Lymph # 1.7 1.0 - 4.8 K/uL    Mono # 0.8 0.3 - 1.0 K/uL    Eos # 0.1 0.0 - 0.5 K/uL    Baso # 0.02 0.00 - 0.20 K/uL    nRBC 0 0 /100 WBC    Gran% 70.1 38.0 - 73.0 %    Lymph% 19.1 18.0 - 48.0 %    Mono% 9.1 4.0 - 15.0 %    Eosinophil% 1.1 0.0 - 8.0 %    Basophil% 0.2 0.0 - 1.9 %    Differential Method Automated          Significant Imaging: I have reviewed all pertinent imaging results/findings within the past 24 hours.  I have reviewed and interpreted all pertinent imaging results/findings within the past 24 hours.     CT abdomen  with contrast:    -Peripancreatic inflammatory changes and edema predominantly involving the pancreatic tail and body    -Findings of acute uncomplicated pancreatitis.    -Hepatomegaly and hepatic steatosis.      Assessment/Plan:     Active Diagnoses:    Diagnosis Date Noted POA    PRINCIPAL PROBLEM:  Acute pancreatitis without infection or necrosis [K85.90] 03/12/2019 Yes    Upper abdominal pain [R10.10] 03/12/2019 Yes    Uncontrolled type 2 diabetes mellitus with hyperosmolar nonketotic hyperglycemia [E11.00] 03/12/2019 Yes    New onset type 2 diabetes mellitus [E11.9] 03/12/2019 Yes    Volume depletion [E86.9] 03/12/2019 Yes    Nausea [R11.0] 03/12/2019 Yes    DAWOOD (acute kidney injury) [N17.9] 03/12/2019 Yes    Abdominal pain [R10.9] 03/11/2019 Yes      Problems Resolved During this Admission:     Mr. Riggins is a 43 year healthy male presented to ED with severe upper abdominal pain with nausea x 4 days. Found to have acute pancreatitis and HHS with glucose ~ 700.    #Acute pancreatitis without infection or necrosis   -CT abdomen showed acute pancreatitis over tail and body area without necrosis or abscess   -received 3 L IVF and dilaudid in ED   -LFT wnl and denies ETOH intake   -supportive care with NPO, IVF, pain control with IV dilaudid prn  -check lipid panel and triglyceride with am labs     # New onset DM2  HHS with glucose ~700  Pseudohyponatremia 2/2 hyperglycemia     -NOT in DKA   -glucose improved with 280 with 3L IVF and insulin 5 units IV  -no need to place on insulin infusion   -will start on levemir 15 units HS (given) and SSI while NPO  -may need novolog TIDWM when starts on diet   -check A1C     #DAWOOD  -Scr 1.5 with unknown baseline   -likely prerenal due to HHS  -expect to improve with IVF     #Gout   -no signs of flare up  -continue home allopurinol       VTE Risk Mitigation (From admission, onward)        Ordered     enoxaparin injection 40 mg  Every 24 hours (non-standard times)       03/12/19 0328            Amna Helm DO  Department of Hospital Medicine   [facility]

## 2019-03-12 NOTE — ED NOTES
LOC: The patient is awake, alert, and oriented to place, time, situation. Affect is appropriate.  Speech is appropriate and clear.     APPEARANCE: Patient resting uncomfortably, reporting abdominal pain,  in no acute distress.  Patient is clean and well groomed.    SKIN: The skin is warm and dry; color consistent with ethnicity.  Patient has normal skin turgor and moist mucus membranes.  Skin intact; no breakdown or bruising noted.     MUSCULOSKELETAL: Patient moving upper and lower extremities without difficulty.  Denies weakness.     RESPIRATORY: Airway is open and patent. Respirations spontaneous, even, easy, and non-labored.  Patient has a normal effort and rate.  No accessory muscle use noted. Denies cough.     CARDIAC:  Normal rhythm and rate noted.  No peripheral edema noted. No complaints of chest pain.      ABDOMEN: tender to palpation.  No distention noted.     NEUROLOGIC: Eyes open spontaneously.  Behavior appropriate to situation.  Follows commands; facial expression symmetrical.  Purposeful motor response noted; normal sensation in all extremities.

## 2019-03-12 NOTE — PROGRESS NOTES
Progress Note  Hospital Medicine  [facility]       Patient Name: Elliott Riggins  MRN:  [id number]  Hospital Medicine Team: [facility] Josy Zhao MD  Date of Admission:  3/11/2019     Length of Stay:  LOS: 1 day   Expected Discharge Date: 3/15/2019  Principal Problem:  Acute pancreatitis without infection or necrosis     Subjective:     Interval History/Overnight Events:    Patient admitted overnight with abdominal pain and was found to have pancreatitis as well as hyper new onset uncontrolled diabetes.  Patient has spent the entire night in the ED due to lack of beds.  He is feeling a little bit better today with IV fluids, however he continues to have significant abdominal pain. Pain medications have been escalated. Continues to have anorexia.  Denies any fevers or chills.  A KI has resolved with IV fluids     allopurinol  100 mg Oral Daily    atorvastatin  40 mg Oral Daily    enoxparin  40 mg Subcutaneous Q24H    insulin detemir U-100  15 Units Subcutaneous QHS        lactated ringers 100 mL/hr at 03/12/19 1522       acetaminophen, dextrose 50%, glucagon (human recombinant), HYDROmorphone, HYDROmorphone, insulin aspart U-100, ondansetron, ondansetron, ondansetron, polyethylene glycol, ramelteon, senna-docusate 8.6-50 mg, sodium chloride 0.9%, sodium chloride 0.9%    Review of Systems   Constitutional: Negative for chills, fatigue, fever.   HENT: Negative for sore throat, trouble swallowing.    Eyes: Negative for photophobia, visual disturbance.   Respiratory: Negative for cough, shortness of breath.    Cardiovascular: Negative for chest pain, palpitations, leg swelling.   Gastrointestinal: Positive for abdominal distention, abdominal pain and nausea. Negative for anal bleeding, blood in stool, constipation, diarrhea, rectal pain and vomiting.   Endocrine: Negative for cold intolerance, heat intolerance.   Genitourinary: Negative for dysuria, frequency.    Musculoskeletal: Negative for arthralgias, myalgias.   Skin: Negative for rash, wound, erythema   Neurological: Negative for dizziness, syncope, weakness, light-headedness.   Psychiatric/Behavioral: Negative for confusion, hallucinations, anxiety  All other systems reviewed and are negative.    Objective:     Temp:  [97.8 °F (36.6 °C)-98.7 °F (37.1 °C)]   Pulse:  []   Resp:  [14-27]   BP: (101-140)/(56-85)   SpO2:  [96 %-97 %]       Physical Exam   Constitutional: He is oriented to person, place, and time. He appears well-developed and well-nourished. No distress.   Appears uncomfortable due to abdominal pain    HENT:   Head: Normocephalic and atraumatic.   Nose: Nose normal.   Mouth/Throat: No oropharyngeal exudate.   Dry oral mucosa    Eyes: Conjunctivae and EOM are normal. Pupils are equal, round, and reactive to light. No scleral icterus.   Neck: Normal range of motion. Neck supple. No JVD present. No tracheal deviation present. No thyromegaly present.   Cardiovascular: Normal rate, regular rhythm, normal heart sounds and intact distal pulses.   No murmur heard.  Pulmonary/Chest: Effort normal and breath sounds normal. No respiratory distress. He has no wheezes. He has no rales. He exhibits no tenderness.   Abdominal: Soft. He exhibits distension (Mild abdominal distention ). He exhibits no mass. There is tenderness (moderate TTP across upper abdomen ). There is no rebound and no guarding.   Decreased bowel sounds    Musculoskeletal: Normal range of motion. He exhibits no edema or tenderness.   Old surgical scar over R ankle.    Lymphadenopathy:     He has no cervical adenopathy.   Neurological: He is alert and oriented to person, place, and time. He has normal reflexes. He displays normal reflexes. No cranial nerve deficit. He exhibits normal muscle tone. Coordination normal.   Skin: Skin is warm and dry. No rash noted. He is not diaphoretic. No erythema.   Psychiatric: He has a normal mood and affect.  Judgment and thought content normal.     Labs:    Chemistries:   Recent Labs   Lab 03/11/19 2025 03/12/19  0420   * 136   K 3.9 3.2*   CL 94* 104   CO2 23 24   BUN 7 5*   CREATININE 1.5* 0.8   CALCIUM 9.7 8.5*   PROT 8.1 6.6   BILITOT 0.9 0.9   ALKPHOS 93 87   ALT 44 32   AST 14 19   MG 2.5 2.0   PHOS 2.2* 2.2*        WBC:   Recent Labs   Lab 03/11/19 2025 03/12/19  0420   WBC 9.99 8.97     Bands:     CBC/Anemia Labs: Coags:    Recent Labs   Lab 03/11/19 2025 03/12/19  0420   WBC 9.99 8.97   HGB 15.1 13.4*   HCT 46.1 40.0    150   MCV 87 87   RDW 13.2 13.2    No results for input(s): PT, INR, APTT in the last 168 hours.     POCT Glucose: HbA1c:    Recent Labs   Lab 03/11/19  2312 03/12/19  0041 03/12/19  0734 03/12/19  1603   POCTGLUCOSE 279* 246* 206* 255*    Hemoglobin A1C   Date Value Ref Range Status   03/11/2019 10.7 (H) 4.0 - 5.6 % Final     Comment:          Assessment and Plan     Hospital Course:    Mr. Riggins is a 43 year healthy male presented to ED with severe upper abdominal pain with nausea x 4 days. Found to have acute pancreatitis and HHS with glucose ~ 700.      Active Hospital Problems    Diagnosis  POA    *Acute pancreatitis without infection or necrosis [K85.90]  Yes    Upper abdominal pain [R10.10]  Yes    Uncontrolled type 2 diabetes mellitus with hyperosmolar nonketotic hyperglycemia [E11.00]  Yes    New onset type 2 diabetes mellitus [E11.9]  Yes    Volume depletion [E86.9]  Yes    Nausea [R11.0]  Yes    DAWOOD (acute kidney injury) [N17.9]  Yes    Mixed hyperlipidemia [E78.2]  Yes    Hepatic steatosis [K76.0]  Yes      Resolved Hospital Problems   No resolved problems to display.       #Acute pancreatitis without infection or necrosis   -CT abdomen showed acute pancreatitis over tail and body area without necrosis or abscess   -LFT wnl and denies ETOH intake .  No laboratory or imaging evidence for gallstones/cholecystitis  - concern that pancreatitis is related due to  uncontrolled diabetes versus hyperlipidemia/hypertriglyceridemia versus medication induced.  Patient has been on Chantix colchicine and allopurinol recently  -IV lactated Ringer's drip  - pain control with IV dilaudid prn- regimen increased due to uncontrolled pain  -advanced diet to clear liquids as patient can tolerate     # New onset DM2  HHS with glucose ~700  Pseudohyponatremia 2/2 hyperglycemia   -A1C  10.7  -continue levemir 15 units HS (given) and SSI   -will check labs for type 1 diabetes given young age and pancreatitis presentation     #DAWOOD - resolved  -Scr 1.5 with unknown baseline   -likely prerenal due to HHS  -resolved with IVF     # HLD  - uncontrolled hypercholesterolemia as well as hypertriglyceridemia with triglycerides at 450  - atorvastatin 40 mg started     #Gout   -no signs of flare up  -continue home allopurinol     Diet:  Clear liquids      High Risk Conditions:  Pancreatitis     Disposition:    3/14-3/15    Signing Physician:     Josy Zhao MD  Department of Hospital Medicine   [facility]  Pager [phone] [id number]  3/12/2019

## 2019-03-12 NOTE — ED TRIAGE NOTES
Seen Saturday for same pain in [place], dx with pancreatitis.  Pain never went away, denies n/v.  10/10.  BM on Friday was normal but states he is currently constipated.

## 2019-03-12 NOTE — ED PROVIDER NOTES
Encounter Date: 3/11/2019    SCRIBE #1 NOTE: I, Clemente Randle, am scribing for, and in the presence of, Chano Altamirano MD.       History     Chief Complaint   Patient presents with    Abdominal Pain     c/o abd pain x 1 month that has gotten worse over the last week. Was dx with mild pancreatitis and discharged from an ED in [place] on Saturday.      43 year old male with PMHx of pancreatitis presents to the ED with complaint of intermittent upper abd pain with associated nausea and distention that started one month ago. Patient states that the pain worsened four days ago and radiates to his back. He reports that he was seen in an ED in [place] two days ago for same where he received an abdominal CT that revealed acute pancreatitis. He denies experiencing similar symptoms in the past. No exacerbating or mitigating factors. He denies history of DM, gall stones, kidney disease, or liver disease. He denies fever, CP, SOB, vomiting, diarrhea, constipation, or any other complaints at this time.          Review of patient's allergies indicates:   Allergen Reactions    Nsaids (non-steroidal anti-inflammatory drug) Anaphylaxis     Past Medical History:   Diagnosis Date    Pancreatitis      History reviewed. No pertinent surgical history.  History reviewed. No pertinent family history.  Social History     Tobacco Use    Smoking status: Not on file   Substance Use Topics    Alcohol use: Not on file    Drug use: Not on file     Review of Systems   Constitutional: Negative for fever.   HENT: Negative for sore throat.    Respiratory: Negative for shortness of breath.    Cardiovascular: Negative for chest pain.   Gastrointestinal: Positive for abdominal distention, abdominal pain and nausea. Negative for constipation, diarrhea and vomiting.   Genitourinary: Negative for dysuria.   Musculoskeletal: Negative for back pain.   Skin: Negative for rash.   Neurological: Negative for weakness.   Hematological: Does not bruise/bleed  easily.       Physical Exam     Initial Vitals [03/11/19 1934]   BP Pulse Resp Temp SpO2   130/75 (!) 117 20 98.2 °F (36.8 °C) 97 %      MAP       --         Physical Exam    Nursing note and vitals reviewed.  Constitutional: He appears well-developed and well-nourished. He appears distressed.   Appears uncomfortable.   HENT:   Head: Normocephalic and atraumatic.   Mouth/Throat: Oropharynx is clear and moist. Mucous membranes are dry.   Eyes: Conjunctivae are normal.   Neck: Normal range of motion.   Cardiovascular: Normal rate, regular rhythm and normal heart sounds.   Pulmonary/Chest: Breath sounds normal. No respiratory distress.   Abdominal: Soft. He exhibits distension. There is tenderness in the epigastric area.   Musculoskeletal: Normal range of motion.   Neurological: He is alert and oriented to person, place, and time.   Skin: Skin is warm and dry.         ED Course   Procedures  Labs Reviewed   CBC W/ AUTO DIFFERENTIAL - Abnormal; Notable for the following components:       Result Value    Immature Granulocytes 0.7 (*)     Gran # (ANC) 7.9 (*)     Immature Grans (Abs) 0.07 (*)     Gran% 78.9 (*)     Lymph% 11.3 (*)     All other components within normal limits   COMPREHENSIVE METABOLIC PANEL - Abnormal; Notable for the following components:    Sodium 129 (*)     Chloride 94 (*)     Glucose 704 (*)     Creatinine 1.5 (*)     eGFR if non  56.2 (*)     All other components within normal limits   URINALYSIS, REFLEX TO URINE CULTURE - Abnormal; Notable for the following components:    Color, UA Colorless (*)     Glucose, UA 3+ (*)     Occult Blood UA 2+ (*)     All other components within normal limits    Narrative:     Preferred Collection Type->Urine, Clean Catch  yellow and grey   HEMOGLOBIN A1C - Abnormal; Notable for the following components:    Hemoglobin A1C 10.7 (*)     Estimated Avg Glucose 260 (*)     All other components within normal limits    Narrative:     ADD ON [facility] ORDER NUMBER  [id number] PER DR. BEAR MOSLEY @    03/11/2019  22:04   Collection has been rescheduled by LINDA at 03/11/2019 22:06 Reason:   Nurse Draw. ED  Collection has been rescheduled by PAW at 03/11/2019 22:07 Reason:   Nurse Draw   ISTAT PROCEDURE - Abnormal; Notable for the following components:    POC PCO2 32.0 (*)     POC HCO3 21.7 (*)     POC SATURATED O2 79 (*)     POC TCO2 23 (*)     All other components within normal limits   LIPASE   URINALYSIS MICROSCOPIC    Narrative:     Preferred Collection Type->Urine, Clean Catch  yellow and grey   BETA - HYDROXYBUTYRATE, SERUM   OSMOLALITY, SERUM   OSMOLALITY, URINE RANDOM   HEMOGLOBIN A1C   MAGNESIUM   PHOSPHORUS   OSMOLALITY, URINE RANDOM   OSMOLALITY, SERUM   POTASSIUM   POCT GLUCOSE MONITORING CONTINUOUS          Imaging Results          CT Abdomen Pelvis With Contrast (Final result)  Result time 03/11/19 22:36:42    Final result by Joaquín Henriquez MD (03/11/19 22:36:42)                 Impression:      Findings of acute uncomplicated pancreatitis.    Hepatomegaly and hepatic steatosis.      Electronically signed by: Joaquín Henriquez MD  Date:    03/11/2019  Time:    22:36             Narrative:    EXAMINATION:  CT ABDOMEN PELVIS WITH CONTRAST    CLINICAL HISTORY:  Severe pancreatitis;    TECHNIQUE:  Low dose axial images, sagittal and coronal reformations were obtained from the lung bases to the pubic symphysis following the IV administration of 100 mL of Omnipaque 350 .  Oral contrast was not given. Postcontrast images were also obtained in the delayed phase.    COMPARISON:  None.    FINDINGS:  Lower Chest:    Bibasilar subsegmental atelectasis.  Heart size is normal.  No pleural or pericardial effusion.    Abdomen:    Liver is enlarged, measuring approximately 23 cm in craniocaudal extent.  There is diffuse hypoattenuation of the hepatic parenchyma in keeping with hepatic steatosis.  No focal hepatic lesion identified.  Gallbladder is unremarkable.  No  calcified gallstones.  No biliary ductal dilation.    Peripancreatic inflammatory changes and edema predominantly involving the pancreatic tail and body.  No pancreatic necrosis.  No organized peripancreatic fluid collection or vascular complication.  No pancreatic ductal dilation.  There is mild pancreatic atrophy and minimal pancreatic parenchymal calcifications.    Spleen is at the upper limits of normal in size.  Adrenal glands are unremarkable.    The kidneys are symmetric.  No hydronephrosis. Subcentimeter hypodensity in the left kidney is too small to definitively characterize but likely represents a cyst.    No small bowel obstruction.  The appendix is normal.    No pneumoperitoneum or organized fluid collection.    No bulky lymphadenopathy.    Abdominal aorta is normal in caliber .    Portal, splenic, and superior mesenteric veins are patent.    Pelvis:    Urinary bladder, pelvic organs, and rectum are unremarkable.  No free fluid in the pelvis.  No pelvic lymphadenopathy.    Bones and soft tissues:    No aggressive osseous lesions.  Soft tissues are unremarkable.                                 Medical Decision Making:   History:   I obtained history from: someone other than patient.  Old Medical Records: I decided to obtain old medical records.  Initial Assessment:   Emergent evaluation of severe abdominal pain  Differential Diagnosis:   Pancreatitis, cholelithiasis, PUD  Clinical Tests:   Lab Tests: Ordered and Reviewed  Radiological Study: Ordered and Reviewed  ED Management:  Patient initially evaluated by intake provider, ordered placed and podded to an ED bed. Patient appears uncomfortable despite pain medication and IV fluids. I have reviewed lab work and noted significantly elevated glucose. Patient denies history of DM. Will add labs to evaluate for DKA. CT pending.  Other:   I have discussed this case with another health care provider.              Attending Attestation:         Attending  Critical Care:   Critical Care Times:   Direct Patient Care (initial evaluation, reassessments, and time considering the case)................................................................30 minutes.   Additional History from reviewing old medical records or taking additional history from the family, EMS, PCP, etc.......................5 minutes.   Ordering, Reviewing, and Interpreting Diagnostic Studies...............................................................................................................5 minutes.   Documentation..................................................................................................................................................................................5 minutes.   Consultation with other Physicians. .................................................................................................................................................10 minutes.   ==============================================================  · Total Critical Care Time - exclusive of procedural time: 55 minutes.  ==============================================================  Critical care was necessary to treat or prevent imminent or life-threatening deterioration of the following conditions: metabolic crisis.   The following critical care procedures were done by me (see procedure notes): pulse oximetry.   Critical care was time spent personally by me on the following activities: obtaining history from patient or relative, examination of patient, ordering lab, x-rays, and/or EKG, development of treatment plan with patient or relative, ordering and performing treatments and interventions, evaluation of patient's response to treatment, discussion with consultants, interpretation of cardiac measurements and re-evaluation of patient's conition.       Attending ED Notes:   10:48 PM  CT demonstrates acute pancreatitis. Lipase is normal. No evidence of DKA on labs. A1c is elevated.  IV insulin given. Will admit for further IV fluids and pain control.              Clinical Impression:       ICD-10-CM ICD-9-CM   1. Abdominal pain, unspecified abdominal location R10.9 789.00   2. Hyperglycemia R73.9 790.29   3. Acute pancreatitis, unspecified complication status, unspecified pancreatitis type K85.90 577.0         Disposition:   Disposition: Admitted  Condition: Stable                        Chano Altamirano MD  03/11/19 [id number]       Chano Altamirano MD  03/11/19 9430

## 2019-03-13 LAB
ALBUMIN SERPL BCP-MCNC: 3.3 G/DL
ALP SERPL-CCNC: 111 U/L
ALT SERPL W/O P-5'-P-CCNC: 49 U/L
ANION GAP SERPL CALC-SCNC: 7 MMOL/L
AST SERPL-CCNC: 44 U/L
BASOPHILS # BLD AUTO: 0.04 K/UL
BASOPHILS NFR BLD: 0.5 %
BILIRUB SERPL-MCNC: 0.9 MG/DL
BUN SERPL-MCNC: 6 MG/DL
CALCIUM SERPL-MCNC: 9.6 MG/DL
CHLORIDE SERPL-SCNC: 100 MMOL/L
CO2 SERPL-SCNC: 29 MMOL/L
CREAT SERPL-MCNC: 1 MG/DL
DIFFERENTIAL METHOD: ABNORMAL
EOSINOPHIL # BLD AUTO: 0.1 K/UL
EOSINOPHIL NFR BLD: 1.5 %
ERYTHROCYTE [DISTWIDTH] IN BLOOD BY AUTOMATED COUNT: 13.3 %
EST. GFR  (AFRICAN AMERICAN): >60 ML/MIN/1.73 M^2
EST. GFR  (NON AFRICAN AMERICAN): >60 ML/MIN/1.73 M^2
GLUCOSE SERPL-MCNC: 218 MG/DL
HCT VFR BLD AUTO: 44.9 %
HGB BLD-MCNC: 14.3 G/DL
IMM GRANULOCYTES # BLD AUTO: 0.06 K/UL
IMM GRANULOCYTES NFR BLD AUTO: 0.7 %
LYMPHOCYTES # BLD AUTO: 1.6 K/UL
LYMPHOCYTES NFR BLD: 18.8 %
MCH RBC QN AUTO: 28.7 PG
MCHC RBC AUTO-ENTMCNC: 31.8 G/DL
MCV RBC AUTO: 90 FL
MONOCYTES # BLD AUTO: 0.8 K/UL
MONOCYTES NFR BLD: 9.3 %
NEUTROPHILS # BLD AUTO: 5.9 K/UL
NEUTROPHILS NFR BLD: 69.2 %
NRBC BLD-RTO: 0 /100 WBC
PLATELET # BLD AUTO: 184 K/UL
PMV BLD AUTO: 11.5 FL
POCT GLUCOSE: 127 MG/DL (ref 70–110)
POCT GLUCOSE: 220 MG/DL (ref 70–110)
POCT GLUCOSE: 234 MG/DL (ref 70–110)
POCT GLUCOSE: 323 MG/DL (ref 70–110)
POTASSIUM SERPL-SCNC: 3.8 MMOL/L
PROT SERPL-MCNC: 7.8 G/DL
RBC # BLD AUTO: 4.99 M/UL
SODIUM SERPL-SCNC: 136 MMOL/L
WBC # BLD AUTO: 8.48 K/UL

## 2019-03-13 PROCEDURE — 80053 COMPREHEN METABOLIC PANEL: CPT

## 2019-03-13 PROCEDURE — 85025 COMPLETE CBC W/AUTO DIFF WBC: CPT

## 2019-03-13 PROCEDURE — S5571 INSULIN DISPOS PEN 3 ML: HCPCS | Performed by: HOSPITALIST

## 2019-03-13 PROCEDURE — 25000003 PHARM REV CODE 250: Performed by: HOSPITALIST

## 2019-03-13 PROCEDURE — 99232 SBSQ HOSP IP/OBS MODERATE 35: CPT | Mod: ,,, | Performed by: HOSPITALIST

## 2019-03-13 PROCEDURE — 11000001 HC ACUTE MED/SURG PRIVATE ROOM

## 2019-03-13 PROCEDURE — 63600175 PHARM REV CODE 636 W HCPCS: Performed by: HOSPITALIST

## 2019-03-13 PROCEDURE — 99232 PR SUBSEQUENT HOSPITAL CARE,LEVL II: ICD-10-PCS | Mod: ,,, | Performed by: HOSPITALIST

## 2019-03-13 PROCEDURE — 36415 COLL VENOUS BLD VENIPUNCTURE: CPT

## 2019-03-13 RX ORDER — SODIUM CHLORIDE, SODIUM LACTATE, POTASSIUM CHLORIDE, CALCIUM CHLORIDE 600; 310; 30; 20 MG/100ML; MG/100ML; MG/100ML; MG/100ML
INJECTION, SOLUTION INTRAVENOUS CONTINUOUS
Status: ACTIVE | OUTPATIENT
Start: 2019-03-13 | End: 2019-03-13

## 2019-03-13 RX ADMIN — HYDROMORPHONE HYDROCHLORIDE 1 MG: 1 INJECTION, SOLUTION INTRAMUSCULAR; INTRAVENOUS; SUBCUTANEOUS at 12:03

## 2019-03-13 RX ADMIN — HYDROMORPHONE HYDROCHLORIDE 2 MG: 1 INJECTION, SOLUTION INTRAMUSCULAR; INTRAVENOUS; SUBCUTANEOUS at 08:03

## 2019-03-13 RX ADMIN — INSULIN DETEMIR 20 UNITS: 100 INJECTION, SOLUTION SUBCUTANEOUS at 08:03

## 2019-03-13 RX ADMIN — INSULIN DETEMIR 5 UNITS: 100 INJECTION, SOLUTION SUBCUTANEOUS at 11:03

## 2019-03-13 RX ADMIN — INSULIN ASPART 8 UNITS: 100 INJECTION, SOLUTION INTRAVENOUS; SUBCUTANEOUS at 12:03

## 2019-03-13 RX ADMIN — ATORVASTATIN CALCIUM 40 MG: 10 TABLET, FILM COATED ORAL at 08:03

## 2019-03-13 RX ADMIN — SODIUM CHLORIDE, SODIUM LACTATE, POTASSIUM CHLORIDE, AND CALCIUM CHLORIDE: .6; .31; .03; .02 INJECTION, SOLUTION INTRAVENOUS at 03:03

## 2019-03-13 RX ADMIN — HYDROMORPHONE HYDROCHLORIDE 2 MG: 1 INJECTION, SOLUTION INTRAMUSCULAR; INTRAVENOUS; SUBCUTANEOUS at 04:03

## 2019-03-13 RX ADMIN — ALLOPURINOL 100 MG: 100 TABLET ORAL at 08:03

## 2019-03-13 RX ADMIN — SODIUM CHLORIDE, SODIUM LACTATE, POTASSIUM CHLORIDE, AND CALCIUM CHLORIDE: .6; .31; .03; .02 INJECTION, SOLUTION INTRAVENOUS at 02:03

## 2019-03-13 RX ADMIN — ACETAMINOPHEN 650 MG: 325 TABLET ORAL at 02:03

## 2019-03-13 RX ADMIN — ENOXAPARIN SODIUM 40 MG: 100 INJECTION SUBCUTANEOUS at 06:03

## 2019-03-13 RX ADMIN — HYDROMORPHONE HYDROCHLORIDE 2 MG: 1 INJECTION, SOLUTION INTRAMUSCULAR; INTRAVENOUS; SUBCUTANEOUS at 12:03

## 2019-03-13 RX ADMIN — POLYETHYLENE GLYCOL 3350 17 G: 17 POWDER, FOR SOLUTION ORAL at 02:03

## 2019-03-13 NOTE — ED NOTES
This RN attempted to call report. Unit secretary states receiving RN is not available for report at this time. Requests RN calls back in 20 minutes.

## 2019-03-13 NOTE — CONSULTS
Attempted to educate pt due to A1C of  10.7   pt is npo   pt was in excrutiating pain this am and could not educate

## 2019-03-13 NOTE — PROGRESS NOTES
Progress Note  Hospital Medicine  [facility]       Patient Name: Elliott Riggins  MRN:  [id number]  Hospital Medicine Team: [facility] MED S Josy Zhao MD  Date of Admission:  3/11/2019     Length of Stay:  LOS: 2 days   Expected Discharge Date: 3/15/2019  Principal Problem:  Acute pancreatitis without infection or necrosis     Subjective:     Interval History/Overnight Events:    Patient continues to feel better since admission.  He continues to require IV Dilaudid for pain control, however he notes the pain is better controlled now.  He has remained in IV fluids.  He has tolerated clear liquid diet well with no increasing abdominal pain or nausea vomiting.   Escalating patient's insulin based on his glucose checks  Family at bedside was updated     allopurinol  100 mg Oral Daily    atorvastatin  40 mg Oral Daily    enoxparin  40 mg Subcutaneous Q24H    insulin detemir U-100  20 Units Subcutaneous QHS        lactated ringers         acetaminophen, dextrose 50%, glucagon (human recombinant), HYDROmorphone, HYDROmorphone, insulin aspart U-100, ondansetron, ondansetron, ondansetron, polyethylene glycol, ramelteon, senna-docusate 8.6-50 mg, sodium chloride 0.9%, sodium chloride 0.9%    Review of Systems   Constitutional: Negative for chills, fatigue, fever.   HENT: Negative for sore throat, trouble swallowing.    Eyes: Negative for photophobia, visual disturbance.   Respiratory: Negative for cough, shortness of breath.    Cardiovascular: Negative for chest pain, palpitations, leg swelling.   Gastrointestinal: Positive for abdominal distention, abdominal pain   Resolved nausea. Negative for anal bleeding, blood in stool, constipation, diarrhea, rectal pain and vomiting.   Endocrine: Negative for cold intolerance, heat intolerance.   Genitourinary: Negative for dysuria, frequency.   Musculoskeletal: Negative for arthralgias, myalgias.   Skin: Negative for rash, wound, erythema    Neurological: Negative for dizziness, syncope, weakness, light-headedness.   Psychiatric/Behavioral: Negative for confusion, hallucinations, anxiety  All other systems reviewed and are negative.    Objective:     Temp:  [97.1 °F (36.2 °C)-98.5 °F (36.9 °C)]   Pulse:  [86-91]   Resp:  [14-20]   BP: (101-128)/(56-81)   SpO2:  [95 %-98 %]       Physical Exam   Constitutional: He is oriented to person, place, and time. He appears well-developed and well-nourished. No distress.   Appears in mild distress due to abd pain  HENT:   Head: Normocephalic and atraumatic.   Nose: Nose normal.   Mouth/Throat: No oropharyngeal exudate. MMM  Eyes: Conjunctivae and EOM are normal. Pupils are equal, round, and reactive to light. No scleral icterus.   Neck: Normal range of motion. Neck supple. No JVD present. No tracheal deviation present. No thyromegaly present.   Cardiovascular: Normal rate, regular rhythm, normal heart sounds and intact distal pulses.   No murmur heard.  Pulmonary/Chest: Effort normal and breath sounds normal. No respiratory distress. He has no wheezes. He has no rales. He exhibits no tenderness.   Abdominal: Soft. He exhibits distension (Mild abdominal distention ). He exhibits no mass. There is tenderness (moderate TTP across upper abdomen ). There is no rebound and no guarding. + BS  Musculoskeletal: Normal range of motion. He exhibits no edema or tenderness.   Lymphadenopathy:     He has no cervical adenopathy.   Neurological: He is alert and oriented to person, place, and time. He has normal reflexes. He displays normal reflexes. No cranial nerve deficit. He exhibits normal muscle tone. Coordination normal.   Skin: Skin is warm and dry. No rash noted. He is not diaphoretic. No erythema.   Psychiatric: He has a normal mood and affect. Judgment and thought content normal.     Labs:    Chemistries:   Recent Labs   Lab 03/11/19 2025 03/12/19  0420 03/13/19  0439   * 136 136   K 3.9 3.2* 3.8   CL 94* 104  100   CO2 23 24 29   BUN 7 5* 6   CREATININE 1.5* 0.8 1.0   CALCIUM 9.7 8.5* 9.6   PROT 8.1 6.6 7.8   BILITOT 0.9 0.9 0.9   ALKPHOS 93 87 111   ALT 44 32 49*   AST 14 19 44*   MG 2.5 2.0  --    PHOS 2.2* 2.2*  --         WBC:   Recent Labs   Lab 03/11/19 2025 03/12/19  0420 03/13/19  0439   WBC 9.99 8.97 8.48     Bands:     CBC/Anemia Labs: Coags:    Recent Labs   Lab 03/11/19 2025 03/12/19  0420 03/13/19  0439   WBC 9.99 8.97 8.48   HGB 15.1 13.4* 14.3   HCT 46.1 40.0 44.9    150 184   MCV 87 87 90   RDW 13.2 13.2 13.3    No results for input(s): PT, INR, APTT in the last 168 hours.     POCT Glucose: HbA1c:    Recent Labs   Lab 03/12/19  0734 03/12/19  1059 03/12/19  1603 03/12/19  2040 03/13/19  0722 03/13/19  1126   POCTGLUCOSE 206* 234* 255* 245* 220* 323*    Hemoglobin A1C   Date Value Ref Range Status   03/11/2019 10.7 (H) 4.0 - 5.6 % Final     Comment:          Assessment and Plan     Hospital Course:    Mr. Riggins is a 43 year healthy male presented to ED with severe upper abdominal pain with nausea x 4 days. Found to have acute pancreatitis and HHS with glucose ~ 700.      Active Hospital Problems    Diagnosis  POA    *Acute pancreatitis without infection or necrosis [K85.90]  Yes    Upper abdominal pain [R10.10]  Yes    Uncontrolled type 2 diabetes mellitus with hyperosmolar nonketotic hyperglycemia [E11.00]  Yes    New onset type 2 diabetes mellitus [E11.9]  Yes    Volume depletion [E86.9]  Yes    Nausea [R11.0]  Yes    DAWOOD (acute kidney injury) [N17.9]  Yes    Mixed hyperlipidemia [E78.2]  Yes    Hepatic steatosis [K76.0]  Yes      Resolved Hospital Problems   No resolved problems to display.       #Acute pancreatitis without infection or necrosis   -CT abdomen showed acute pancreatitis over tail and body area without necrosis or abscess   -LFT wnl and denies ETOH intake .  No laboratory or imaging evidence for gallstones/cholecystitis  - concern that pancreatitis is related due to  uncontrolled diabetes versus hyperlipidemia/hypertriglyceridemia versus medication induced.  Patient has been on Chantix colchicine and allopurinol recently. Chantix has < 1% chance of pancreatitis   -IV lactated Ringer's drip  -pain control with IV dilaudid prn- regimen increased due to uncontrolled pain  -advanced diet to clear liquid--> bland diet      # New onset DM2  HHS with glucose ~700  Pseudohyponatremia 2/2 hyperglycemia   -A1C  10.7  -continue levemir 20  units HS (given) and SSI   -will check labs for type 1 diabetes given young age and pancreatitis presentation  - nutrition consulted      #DAWOOD - resolved  -Scr 1.5 with unknown baseline   -likely prerenal due to HHS  -resolved with IVF     # HLD  - uncontrolled hypercholesterolemia as well as hypertriglyceridemia with triglycerides at 450  - atorvastatin 40 mg started     #Gout   -no signs of flare up  -continue home allopurinol     Diet:  Barton       High Risk Conditions:  Pancreatitis     Disposition:    3/14-3/15    Signing Physician:     Josy Zhao MD  Department of [facility] Medicine   [facility]  Pager [phone] [place] [phone]  3/13/2019

## 2019-03-14 LAB
ALBUMIN SERPL BCP-MCNC: 3.1 G/DL
ALP SERPL-CCNC: 102 U/L
ALT SERPL W/O P-5'-P-CCNC: 54 U/L
ANION GAP SERPL CALC-SCNC: 11 MMOL/L
AST SERPL-CCNC: 47 U/L
BILIRUB SERPL-MCNC: 0.9 MG/DL
BUN SERPL-MCNC: 6 MG/DL
CALCIUM SERPL-MCNC: 9.4 MG/DL
CHLORIDE SERPL-SCNC: 101 MMOL/L
CO2 SERPL-SCNC: 25 MMOL/L
CREAT SERPL-MCNC: 0.8 MG/DL
EST. GFR  (AFRICAN AMERICAN): >60 ML/MIN/1.73 M^2
EST. GFR  (NON AFRICAN AMERICAN): >60 ML/MIN/1.73 M^2
GAD65 AB SER-SCNC: 0.01 NMOL/L
GLUCOSE SERPL-MCNC: 199 MG/DL
POCT GLUCOSE: 125 MG/DL (ref 70–110)
POCT GLUCOSE: 182 MG/DL (ref 70–110)
POCT GLUCOSE: 211 MG/DL (ref 70–110)
POCT GLUCOSE: 247 MG/DL (ref 70–110)
POCT GLUCOSE: 315 MG/DL (ref 70–110)
POTASSIUM SERPL-SCNC: 3.4 MMOL/L
PROT SERPL-MCNC: 7.3 G/DL
SODIUM SERPL-SCNC: 137 MMOL/L

## 2019-03-14 PROCEDURE — 99232 SBSQ HOSP IP/OBS MODERATE 35: CPT | Mod: ,,, | Performed by: HOSPITALIST

## 2019-03-14 PROCEDURE — 99232 PR SUBSEQUENT HOSPITAL CARE,LEVL II: ICD-10-PCS | Mod: ,,, | Performed by: HOSPITALIST

## 2019-03-14 PROCEDURE — 25000003 PHARM REV CODE 250: Performed by: HOSPITALIST

## 2019-03-14 PROCEDURE — 63600175 PHARM REV CODE 636 W HCPCS: Performed by: HOSPITALIST

## 2019-03-14 PROCEDURE — 11000001 HC ACUTE MED/SURG PRIVATE ROOM

## 2019-03-14 PROCEDURE — 36415 COLL VENOUS BLD VENIPUNCTURE: CPT

## 2019-03-14 PROCEDURE — 80053 COMPREHEN METABOLIC PANEL: CPT

## 2019-03-14 RX ORDER — HYDROMORPHONE HYDROCHLORIDE 1 MG/ML
1 INJECTION, SOLUTION INTRAMUSCULAR; INTRAVENOUS; SUBCUTANEOUS EVERY 4 HOURS PRN
Status: DISCONTINUED | OUTPATIENT
Start: 2019-03-14 | End: 2019-03-15 | Stop reason: HOSPADM

## 2019-03-14 RX ORDER — INSULIN ASPART 100 [IU]/ML
5 INJECTION, SOLUTION INTRAVENOUS; SUBCUTANEOUS
Status: DISCONTINUED | OUTPATIENT
Start: 2019-03-14 | End: 2019-03-15

## 2019-03-14 RX ORDER — ATORVASTATIN CALCIUM 20 MG/1
20 TABLET, FILM COATED ORAL DAILY
Status: DISCONTINUED | OUTPATIENT
Start: 2019-03-14 | End: 2019-03-15

## 2019-03-14 RX ADMIN — INSULIN ASPART 4 UNITS: 100 INJECTION, SOLUTION INTRAVENOUS; SUBCUTANEOUS at 08:03

## 2019-03-14 RX ADMIN — INSULIN ASPART 8 UNITS: 100 INJECTION, SOLUTION INTRAVENOUS; SUBCUTANEOUS at 12:03

## 2019-03-14 RX ADMIN — STANDARDIZED SENNA CONCENTRATE AND DOCUSATE SODIUM 2 TABLET: 8.6; 5 TABLET, FILM COATED ORAL at 09:03

## 2019-03-14 RX ADMIN — HYDROMORPHONE HYDROCHLORIDE 2 MG: 1 INJECTION, SOLUTION INTRAMUSCULAR; INTRAVENOUS; SUBCUTANEOUS at 12:03

## 2019-03-14 RX ADMIN — ATORVASTATIN CALCIUM 20 MG: 20 TABLET, FILM COATED ORAL at 08:03

## 2019-03-14 RX ADMIN — ALLOPURINOL 100 MG: 100 TABLET ORAL at 08:03

## 2019-03-14 RX ADMIN — INSULIN ASPART 5 UNITS: 100 INJECTION, SOLUTION INTRAVENOUS; SUBCUTANEOUS at 05:03

## 2019-03-14 RX ADMIN — POLYETHYLENE GLYCOL 3350 17 G: 17 POWDER, FOR SOLUTION ORAL at 02:03

## 2019-03-14 RX ADMIN — INSULIN ASPART 1 UNITS: 100 INJECTION, SOLUTION INTRAVENOUS; SUBCUTANEOUS at 09:03

## 2019-03-14 RX ADMIN — INSULIN DETEMIR 20 UNITS: 100 INJECTION, SOLUTION SUBCUTANEOUS at 09:03

## 2019-03-14 RX ADMIN — HYDROMORPHONE HYDROCHLORIDE 2 MG: 1 INJECTION, SOLUTION INTRAMUSCULAR; INTRAVENOUS; SUBCUTANEOUS at 10:03

## 2019-03-14 RX ADMIN — ENOXAPARIN SODIUM 40 MG: 100 INJECTION SUBCUTANEOUS at 05:03

## 2019-03-14 RX ADMIN — HYDROMORPHONE HYDROCHLORIDE 1 MG: 1 INJECTION, SOLUTION INTRAMUSCULAR; INTRAVENOUS; SUBCUTANEOUS at 08:03

## 2019-03-14 RX ADMIN — HYDROMORPHONE HYDROCHLORIDE 2 MG: 1 INJECTION, SOLUTION INTRAMUSCULAR; INTRAVENOUS; SUBCUTANEOUS at 05:03

## 2019-03-14 RX ADMIN — HYDROMORPHONE HYDROCHLORIDE 2 MG: 1 INJECTION, SOLUTION INTRAMUSCULAR; INTRAVENOUS; SUBCUTANEOUS at 02:03

## 2019-03-14 NOTE — PLAN OF CARE
On Discharge planning assessment patient is in bed, resting quietly,  Introduced myself and CM role in patients care plan, patient verbalized understanding.     Pt lives in a single story home with his wife and children, he has transportation at discharge.  Patient denies HD, H/H and coumadin.  Verified Pts Address, Emergency Contact, Pharmacy and PCP.     Pt denies any concerns for this visit, CM will continue to follow. CM name and number placed on patients white board.        03/14/19 6385   Discharge Assessment   Assessment Type Discharge Planning Assessment   Confirmed/corrected address and phone number on facesheet? Yes   Assessment information obtained from? Patient   Expected Length of Stay (days) 5   Communicated expected length of stay with patient/caregiver yes   Prior to hospitilization cognitive status: Alert/Oriented   Prior to hospitalization functional status: Independent   Current cognitive status: Alert/Oriented   Current Functional Status: Independent   Lives With spouse;child(dexter), dependent   Able to Return to Prior Arrangements yes   Is patient able to care for self after discharge? Yes   Who are your caregiver(s) and their phone number(s)? Maria Elena Lucas-wife-[phone]   Patient's perception of discharge disposition home or selfcare   Readmission Within the Last 30 Days no previous admission in last 30 days   Patient currently being followed by outpatient case management? No   Patient currently receives any other outside agency services? No   Equipment Currently Used at Home none   Do you have any problems affording any of your prescribed medications? No   Is the patient taking medications as prescribed? yes   Does the patient have transportation home? Yes   Transportation Anticipated family or friend will provide   Does the patient receive services at the Coumadin Clinic? No   Discharge Plan A Home with family   Discharge Plan B Home Health   DME Needed Upon Discharge  none    Patient/Family in Agreement with Plan yes     Extended Emergency Contact Information  Primary Emergency Contact: bruna matamoros  Mobile Phone: [phone]  Relation: Spouse   needed? No

## 2019-03-15 VITALS
OXYGEN SATURATION: 98 % | DIASTOLIC BLOOD PRESSURE: 68 MMHG | SYSTOLIC BLOOD PRESSURE: 109 MMHG | RESPIRATION RATE: 18 BRPM | BODY MASS INDEX: 32.18 KG/M2 | WEIGHT: 205 LBS | HEIGHT: 67 IN | TEMPERATURE: 96 F | HEART RATE: 102 BPM

## 2019-03-15 PROBLEM — R74.01 TRANSAMINITIS: Status: ACTIVE | Noted: 2019-03-15

## 2019-03-15 LAB
ALBUMIN SERPL BCP-MCNC: 3.2 G/DL
ALP SERPL-CCNC: 116 U/L
ALT SERPL W/O P-5'-P-CCNC: 70 U/L
ANION GAP SERPL CALC-SCNC: 14 MMOL/L
AST SERPL-CCNC: 67 U/L
BILIRUB SERPL-MCNC: 0.6 MG/DL
BUN SERPL-MCNC: 10 MG/DL
CALCIUM SERPL-MCNC: 9.8 MG/DL
CHLORIDE SERPL-SCNC: 101 MMOL/L
CO2 SERPL-SCNC: 24 MMOL/L
CREAT SERPL-MCNC: 0.9 MG/DL
EST. GFR  (AFRICAN AMERICAN): >60 ML/MIN/1.73 M^2
EST. GFR  (NON AFRICAN AMERICAN): >60 ML/MIN/1.73 M^2
GLUCOSE SERPL-MCNC: 171 MG/DL
INSULIN AB SER-SCNC: 0 NMOL/L (ref 0–0.02)
POCT GLUCOSE: 224 MG/DL (ref 70–110)
POCT GLUCOSE: 242 MG/DL (ref 70–110)
POTASSIUM SERPL-SCNC: 3.7 MMOL/L
PROT SERPL-MCNC: 7.5 G/DL
SODIUM SERPL-SCNC: 139 MMOL/L

## 2019-03-15 PROCEDURE — 25000003 PHARM REV CODE 250: Performed by: HOSPITALIST

## 2019-03-15 PROCEDURE — 80053 COMPREHEN METABOLIC PANEL: CPT

## 2019-03-15 PROCEDURE — 99239 PR HOSPITAL DISCHARGE DAY,>30 MIN: ICD-10-PCS | Mod: ,,, | Performed by: HOSPITALIST

## 2019-03-15 PROCEDURE — 36415 COLL VENOUS BLD VENIPUNCTURE: CPT

## 2019-03-15 PROCEDURE — 99239 HOSP IP/OBS DSCHRG MGMT >30: CPT | Mod: ,,, | Performed by: HOSPITALIST

## 2019-03-15 RX ORDER — LANCING DEVICE
1 EACH MISCELLANEOUS 2 TIMES DAILY WITH MEALS
Qty: 1 EACH | Refills: 0 | Status: SHIPPED | OUTPATIENT
Start: 2019-03-15 | End: 2019-03-15 | Stop reason: SDUPTHER

## 2019-03-15 RX ORDER — INSULIN ASPART 100 [IU]/ML
10 INJECTION, SOLUTION INTRAVENOUS; SUBCUTANEOUS
Qty: 9 ML | Refills: 0 | Status: SHIPPED | OUTPATIENT
Start: 2019-03-15 | End: 2019-03-15

## 2019-03-15 RX ORDER — INSULIN ASPART 100 [IU]/ML
10 INJECTION, SOLUTION INTRAVENOUS; SUBCUTANEOUS
Qty: 9 ML | Refills: 0 | Status: SHIPPED | OUTPATIENT
Start: 2019-03-15 | End: 2019-04-14

## 2019-03-15 RX ORDER — DEXTROSE 4 G
TABLET,CHEWABLE ORAL
Qty: 1 EACH | Refills: 0 | Status: SHIPPED | OUTPATIENT
Start: 2019-03-15 | End: 2019-03-15 | Stop reason: SDUPTHER

## 2019-03-15 RX ORDER — BLOOD-GLUCOSE CONTROL, NORMAL
1 EACH MISCELLANEOUS 2 TIMES DAILY WITH MEALS
Qty: 100 EACH | Refills: 0 | Status: SHIPPED | OUTPATIENT
Start: 2019-03-15 | End: 2019-03-15 | Stop reason: SDUPTHER

## 2019-03-15 RX ORDER — LANCING DEVICE
1 EACH MISCELLANEOUS 2 TIMES DAILY WITH MEALS
Qty: 1 EACH | Refills: 0 | Status: SHIPPED | OUTPATIENT
Start: 2019-03-15 | End: 2020-03-14

## 2019-03-15 RX ORDER — INSULIN ASPART 100 [IU]/ML
10 INJECTION, SOLUTION INTRAVENOUS; SUBCUTANEOUS
Status: DISCONTINUED | OUTPATIENT
Start: 2019-03-15 | End: 2019-03-15 | Stop reason: HOSPADM

## 2019-03-15 RX ORDER — BLOOD-GLUCOSE CONTROL, NORMAL
1 EACH MISCELLANEOUS 2 TIMES DAILY WITH MEALS
Qty: 100 EACH | Refills: 0 | Status: SHIPPED | OUTPATIENT
Start: 2019-03-15

## 2019-03-15 RX ORDER — ONDANSETRON 4 MG/1
4 TABLET, ORALLY DISINTEGRATING ORAL EVERY 12 HOURS PRN
Qty: 15 TABLET | Refills: 0 | Status: SHIPPED | OUTPATIENT
Start: 2019-03-15

## 2019-03-15 RX ORDER — OXYCODONE HYDROCHLORIDE 10 MG/1
15 TABLET ORAL EVERY 12 HOURS PRN
Qty: 20 TABLET | Refills: 0 | Status: SHIPPED | OUTPATIENT
Start: 2019-03-15

## 2019-03-15 RX ORDER — METFORMIN HYDROCHLORIDE 500 MG/1
500 TABLET ORAL 2 TIMES DAILY WITH MEALS
Qty: 180 TABLET | Refills: 0 | Status: SHIPPED | OUTPATIENT
Start: 2019-03-15 | End: 2020-03-14

## 2019-03-15 RX ORDER — DEXTROSE 4 G
TABLET,CHEWABLE ORAL
Qty: 1 EACH | Refills: 0 | Status: SHIPPED | OUTPATIENT
Start: 2019-03-15 | End: 2020-03-14

## 2019-03-15 RX ADMIN — INSULIN ASPART 4 UNITS: 100 INJECTION, SOLUTION INTRAVENOUS; SUBCUTANEOUS at 09:03

## 2019-03-15 RX ADMIN — INSULIN ASPART 5 UNITS: 100 INJECTION, SOLUTION INTRAVENOUS; SUBCUTANEOUS at 09:03

## 2019-03-15 RX ADMIN — OXYCODONE HYDROCHLORIDE 15 MG: 5 TABLET ORAL at 09:03

## 2019-03-15 RX ADMIN — INSULIN ASPART 4 UNITS: 100 INJECTION, SOLUTION INTRAVENOUS; SUBCUTANEOUS at 12:03

## 2019-03-15 RX ADMIN — INSULIN ASPART 5 UNITS: 100 INJECTION, SOLUTION INTRAVENOUS; SUBCUTANEOUS at 12:03

## 2019-03-15 RX ADMIN — ALLOPURINOL 100 MG: 100 TABLET ORAL at 09:03

## 2019-03-15 RX ADMIN — ATORVASTATIN CALCIUM 20 MG: 20 TABLET, FILM COATED ORAL at 09:03

## 2019-03-15 NOTE — NURSING
Explained and copy of discharge instructions given to patient. He verbalized understanding. Also taught pt and wife on how to use insulin pen with return demonstration.

## 2019-03-15 NOTE — CONSULTS
[facility]  Adult Nutrition  Education Note    Diet Education: Diabetes, heart healthy fats  Time Spent: 15 min  Learners: Pt      Nutrition Education provided with handouts: Meal Planning Using the Plate Method    Diabetes Medications: none (new onset DM)    Comments: Per glycemic index committee protocol, diabetes education provided for HbA1c of 10.7. Noted . Pt not following any diet PTA. Provided and explained handout detailing sources of carbohydrates, appropriate serving sizes, and the plate method for meal planning. Encouraged pt to limit saturated fats and replace with unsaturated fats. Pt voiced understanding. States that reducing intake of fried foods, soda, juice will be most challenging.      Follow-Up: 1x weekly    Please consult if other nutrition-related concerns are identified.

## 2019-03-15 NOTE — PLAN OF CARE
Problem: Adult Inpatient Plan of Care  Goal: Plan of Care Review  Outcome: Ongoing (interventions implemented as appropriate)  Slept most of this shift. Complain earlier of level 7 abdominal pain which Dilaudid was given. Pain med ws affective pt denies any pain post med given. Although there is a BM documented for 03/13 patient informed me that last real  goom BM was on 03/07 which PRN senna 2 tablets was given

## 2019-03-15 NOTE — DISCHARGE SUMMARY
DISCHARGE SUMMARY  Hospital Medicine    Team: [facility] HOSP MED S    Patient Name: Elliott Riggins  YOB: 1975    Admit Date: 3/11/2019    Discharge Date: 03/15/2019    Discharge Attending Physician: Josy Zhao MD     Chief Complaint: Abdominal Pain    Princilpal Diagnoses:  Active Hospital Problems    Diagnosis  POA    *Acute pancreatitis without infection or necrosis [K85.90]  Yes    Transaminitis [R74.0]  No    Upper abdominal pain [R10.10]  Yes    Uncontrolled type 2 diabetes mellitus with hyperosmolar nonketotic hyperglycemia [E11.00]  Yes    New onset type 2 diabetes mellitus [E11.9]  Yes    Volume depletion [E86.9]  Yes    Nausea [R11.0]  Yes    DAWOOD (acute kidney injury) [N17.9]  Yes    Mixed hyperlipidemia [E78.2]  Yes    Hepatic steatosis [K76.0]  Yes      Resolved Hospital Problems   No resolved problems to display.       Discharged Condition: Admit problems have stabilized       HOSPITAL COURSE:      Initial Presentation:    As per admitting provider,     43 year old male with PMH of gout on allopurinol presented to ED with complaint of worsening abdominal pain for last 4 days. States he has been having intermittent indigestion with abdominal discomfort and bloating for last one month. He came to [place], [place] last week from [place] to attend a wedding. But developed severe epigastric, RUQ abdominal pain radiating to back on Friday associated with nausea but no vomiting. He went to ED at [place] Saturday where CT abdomen reportedly showed pancreatitis. States he was given IVF and he left ED to attend wedding Saturday evening. He drove to [facility] next day to visit family. He continues to have abdominal pain with inability to tolerate PO intake and thus decided to come to ED. Also reports polyuria, polydipsia for last few days. He is found to have HHS with glucose ~700 but not in DKA. CT abdomen showed acute uncomplicated pancreatitis over tail and body area. Denies  fever,chills, alcohol intake, tobacco abuse, prior history of pancreatitis or gallstone, diabetes.      His glucose improved to 280 after 3L IVF and insulin 5 units IVP. Abdominal pain improved with dilaudid given earlier but now feels like pain is coming back.       Course of Principle Problem for Admission:     Acute pancreatitis without infection or necrosis   -CT abdomen showed acute pancreatitis over tail and body area without necrosis or abscess   -LFT wnl and denies ETOH intake .  No laboratory or imaging evidence for gallstones/cholecystitis  - concern that pancreatitis is related due to uncontrolled diabetes versus hyperlipidemia/hypertriglyceridemia versus medication induced.  Patient has been on Chantix colchicine and allopurinol recently. Chantix has < 1% chance of pancreatitis   -s/p IV lactated Ringer's drip  -pain control with IV dilaudid prn- regimen increased due to uncontrolled pain--> PO meds  -advanced diet to clear liquid--> bland diet --> diabetic diet   - tolerated well. Discharged with oxycodone 10mg PRN severe pain       Uncontrolled type 2 diabetes mellitus with hyperosmolar nonketotic hyperglycemia  New onset type 2 diabetes mellitus  -A1C  10.7  -started on insulin   - increased levemir 25  units QHS  - apart 10 u TID with meals    - metformin 500 mg BID started on d/c  - nutrition consulted - diabetic diet discussed   - needs Endocrinology set up at home in [place]    On the day of d/c, patient was doing well with no acute issues. Discharged with meds below and oxycodone 10mg PRN severe pain. Insulin adjusted. Education give. Metformin started. Statin stopped due to AST ALT in 60s-70s    Physical exam:   General: Well developed. Well nourished. NAD.   HENT: NCAT. No oropharyngeal exudate.   Eyes: EOMI. No scleral icterus.  Neck: Supple. No JVD. No Thyromegaly.   Lymph: no cervical or supraclavicular adenopathy  CV: RRR.No R/M/G.  Intact distal pulses.   LE: No edema.  Resp: CTA and P , No  w/r/r.  Abd: NT/ ND. No masses , no reboud. No scar or striae.   MS: normal muscle tone. No tenderness. No apparent deformities.  Skin: Warm/ dry. No acanthosis. Skin turgor wnl, capillary refill < 2 seconds.   Neuro: AAOx 3. No abnormal coordination. CN II-XII grossly intact  Motor : 5/5 B/L  Psych: Normal mood and affect. Thought content normal.    Other Medical Problems Addressed in the Hospital:    DAWOOD - resolved  -Scr 1.5 with unknown baseline   -likely prerenal due to HHS  -resolved with IVF    Transaminitis  HLD  - atorvastatin 20 mg started for HLD/ TGs in 400s  - stopped statin when AST ALT increased to 70s  - needs repeat labs at follow up    Gout  -no signs of flare up  -continue home allopurinol       CONSULTS: none     PROCEDURES: none    Labs:    Chemistries:   Recent Labs   Lab 03/11/19 2025 03/12/19 0420 03/13/19 0439 03/14/19  0529 03/15/19  0302   * 136 136 137 139   K 3.9 3.2* 3.8 3.4* 3.7   CL 94* 104 100 101 101   CO2 23 24 29 25 24   BUN 7 5* 6 6 10   CREATININE 1.5* 0.8 1.0 0.8 0.9   CALCIUM 9.7 8.5* 9.6 9.4 9.8   PROT 8.1 6.6 7.8 7.3 7.5   BILITOT 0.9 0.9 0.9 0.9 0.6   ALKPHOS 93 87 111 102 116   ALT 44 32 49* 54* 70*   AST 14 19 44* 47* 67*   MG 2.5 2.0  --   --   --    PHOS 2.2* 2.2*  --   --   --         WBC:   Recent Labs   Lab 03/11/19 2025 03/12/19 0420 03/13/19 0439   WBC 9.99 8.97 8.48     Bands:     CBC/Anemia Labs: Coags:    Recent Labs   Lab 03/11/19 2025 03/12/19 0420 03/13/19 0439   WBC 9.99 8.97 8.48   HGB 15.1 13.4* 14.3   HCT 46.1 40.0 44.9    150 184   MCV 87 87 90   RDW 13.2 13.2 13.3    No results for input(s): PT, INR, APTT in the last 168 hours.     Diagnostic Results:    CT abdomen pelvis 3/11/19    Findings of acute uncomplicated pancreatitis.    Disposition:  Home      Future Scheduled Appointments:  PCP  Endocrinology in [place]       Discharge Medication List:     Elliott Riggins   Home Medication Instructions JOCE:[id number]    Printed on:03/15/19  133   Medication Information                      ALLOPURINOL ORAL  Take by mouth.             blood sugar diagnostic Strp  1 strip by Misc.(Non-Drug; Combo Route) route 2 (two) times daily with meals.             blood-glucose meter kit  Use as instructed             finasteride (PROSCAR ORAL)  Take by mouth.             insulin aspart U-100 (NOVOLOG) 100 unit/mL InPn pen  Inject 10 Units into the skin 3 (three) times daily with meals.             insulin detemir U-100 (LEVEMIR FLEXTOUCH) 100 unit/mL (3 mL) SubQ InPn pen  Inject 25 Units into the skin every evening.             lancets Misc  1 lancet by Misc.(Non-Drug; Combo Route) route 2 (two) times daily with meals.             lancing device Misc  1 Device by Misc.(Non-Drug; Combo Route) route 2 (two) times daily with meals.             metFORMIN (GLUCOPHAGE) 500 MG tablet  Take 1 tablet (500 mg total) by mouth 2 (two) times daily with meals.             ondansetron (ZOFRAN-ODT) 4 MG TbDL  Take 1 tablet (4 mg total) by mouth every 12 (twelve) hours as needed (nausea).             oxyCODONE (ROXICODONE) 10 mg Tab immediate release tablet  Take 1.5 tablets (15 mg total) by mouth every 12 (twelve) hours as needed (severe pain).                   At the time of discharge patient was told to take all medications as prescribed, to keep all followup appointments, and to call their primary care physician or return to the emergency room if they have any worsening or concerning symptoms.    Time spent on the discharge of the patient including review of hospital course with the patient. reviewing discharge medications and arranging follow-up care 45 minutes.  Patient was seen and examined on the date of discharge and determined to be suitable for discharge.        Signing Physician:  Josy Zhao MD

## 2019-03-15 NOTE — PROGRESS NOTES
Progress Note  Hospital Medicine  [facility]       Patient Name: Elliott Riggins  MRN:  [id number]  Hospital Medicine Team: [facility] [place] [facility] S Josy Zhao MD  Date of Admission:  3/11/2019     Length of Stay:  LOS: 3 days   Expected Discharge Date: 3/15/2019  Principal Problem:  Acute pancreatitis without infection or necrosis     Subjective:     Interval History/Overnight Events:    Dong well today . Tolerated bland diet well with no increasing abdominal pain or nausea vomiting.   Escalating patient's insulin based on his glucose checks  IV pain meds de escalated to PO  Family at bedside was updated  anticipating dc tmrw      allopurinol  100 mg Oral Daily    atorvastatin  20 mg Oral Daily    enoxparin  40 mg Subcutaneous Q24H    insulin aspart U-100  5 Units Subcutaneous TIDWM    insulin detemir U-100  20 Units Subcutaneous QHS           acetaminophen, dextrose 50%, glucagon (human recombinant), HYDROmorphone, insulin aspart U-100, ondansetron, ondansetron, oxyCODONE, polyethylene glycol, ramelteon, senna-docusate 8.6-50 mg, sodium chloride 0.9%, sodium chloride 0.9%    Review of Systems   Constitutional: Negative for chills, fatigue, fever.   HENT: Negative for sore throat, trouble swallowing.    Eyes: Negative for photophobia, visual disturbance.   Respiratory: Negative for cough, shortness of breath.    Cardiovascular: Negative for chest pain, palpitations, leg swelling.   Gastrointestinal: Positive for abdominal distention, abdominal pain   Resolved nausea. Negative for anal bleeding, blood in stool, constipation, diarrhea, rectal pain and vomiting.   Endocrine: Negative for cold intolerance, heat intolerance.   Genitourinary: Negative for dysuria, frequency.   Musculoskeletal: Negative for arthralgias, myalgias.   Skin: Negative for rash, wound, erythema   Neurological: Negative for dizziness, syncope, weakness, light-headedness.   Psychiatric/Behavioral: Negative for  confusion, hallucinations, anxiety  All other systems reviewed and are negative.    Objective:     Temp:  [96.7 °F (35.9 °C)-98.6 °F (37 °C)]   Pulse:  [83-96]   Resp:  [14-20]   BP: (103-131)/(63-85)   SpO2:  [92 %-99 %]       Physical Exam   Constitutional: He is oriented to person, place, and time. He appears well-developed and well-nourished. No distress.   Appears in mild distress due to abd pain  HENT:   Head: Normocephalic and atraumatic.   Nose: Nose normal.   Mouth/Throat: No oropharyngeal exudate. MMM  Eyes: Conjunctivae and EOM are normal. Pupils are equal, round, and reactive to light. No scleral icterus.   Neck: Normal range of motion. Neck supple. No JVD present. No tracheal deviation present. No thyromegaly present.   Cardiovascular: Normal rate, regular rhythm, normal heart sounds and intact distal pulses.   No murmur heard.  Pulmonary/Chest: Effort normal and breath sounds normal. No respiratory distress. He has no wheezes. He has no rales. He exhibits no tenderness.   Abdominal: Soft. He exhibits distension (Mild abdominal distention ). He exhibits no mass. There is tenderness (moderate TTP across upper abdomen ). There is no rebound and no guarding. + BS  Musculoskeletal: Normal range of motion. He exhibits no edema or tenderness.   Lymphadenopathy:     He has no cervical adenopathy.   Neurological: He is alert and oriented to person, place, and time. He has normal reflexes. He displays normal reflexes. No cranial nerve deficit. He exhibits normal muscle tone. Coordination normal.   Skin: Skin is warm and dry. No rash noted. He is not diaphoretic. No erythema.   Psychiatric: He has a normal mood and affect. Judgment and thought content normal.     Labs:    Chemistries:   Recent Labs   Lab 03/11/19 2025 03/12/19  0420 03/13/19  0439 03/14/19  0529   * 136 136 137   K 3.9 3.2* 3.8 3.4*   CL 94* 104 100 101   CO2 23 24 29 25   BUN 7 5* 6 6   CREATININE 1.5* 0.8 1.0 0.8   CALCIUM 9.7 8.5* 9.6  9.4   PROT 8.1 6.6 7.8 7.3   BILITOT 0.9 0.9 0.9 0.9   ALKPHOS 93 87 111 102   ALT 44 32 49* 54*   AST 14 19 44* 47*   MG 2.5 2.0  --   --    PHOS 2.2* 2.2*  --   --         WBC:   Recent Labs   Lab 03/11/19 2025 03/12/19  0420 03/13/19  0439   WBC 9.99 8.97 8.48     Bands:     CBC/Anemia Labs: Coags:    Recent Labs   Lab 03/11/19 2025 03/12/19  0420 03/13/19  0439   WBC 9.99 8.97 8.48   HGB 15.1 13.4* 14.3   HCT 46.1 40.0 44.9    150 184   MCV 87 87 90   RDW 13.2 13.2 13.3    No results for input(s): PT, INR, APTT in the last 168 hours.     POCT Glucose: HbA1c:    Recent Labs   Lab 03/13/19  1802 03/13/19  2041 03/14/19  0712 03/14/19  1113 03/14/19  1735 03/14/19  2100   POCTGLUCOSE 127* 247* 211* 315* 125* 182*    Hemoglobin A1C   Date Value Ref Range Status   03/11/2019 10.7 (H) 4.0 - 5.6 % Final     Comment:          Assessment and Plan     Hospital Course:    Mr. Riggins is a 43 year healthy male presented to ED with severe upper abdominal pain with nausea x 4 days. Found to have acute pancreatitis and HHS with glucose ~ 700.      Active Hospital Problems    Diagnosis  POA    *Acute pancreatitis without infection or necrosis [K85.90]  Yes    Upper abdominal pain [R10.10]  Yes    Uncontrolled type 2 diabetes mellitus with hyperosmolar nonketotic hyperglycemia [E11.00]  Yes    New onset type 2 diabetes mellitus [E11.9]  Yes    Volume depletion [E86.9]  Yes    Nausea [R11.0]  Yes    DAWOOD (acute kidney injury) [N17.9]  Yes    Mixed hyperlipidemia [E78.2]  Yes    Hepatic steatosis [K76.0]  Yes      Resolved Hospital Problems   No resolved problems to display.       #Acute pancreatitis without infection or necrosis   -CT abdomen showed acute pancreatitis over tail and body area without necrosis or abscess   -LFT wnl and denies ETOH intake .  No laboratory or imaging evidence for gallstones/cholecystitis  - concern that pancreatitis is related due to uncontrolled diabetes versus  hyperlipidemia/hypertriglyceridemia versus medication induced.  Patient has been on Chantix colchicine and allopurinol recently. Chantix has < 1% chance of pancreatitis   -s/p IV lactated Ringer's drip  -pain control with IV dilaudid prn- regimen increased due to uncontrolled pain--> PO meds  -advanced diet to clear liquid--> bland diet --> diabetic      # New onset DM2  HHS with glucose ~700  Pseudohyponatremia 2/2 hyperglycemia   -A1C  10.7  -continue levemir 20  units HS (given) and SSI   - apart 5 u TID  -will check labs for type 1 diabetes given young age and pancreatitis presentation  - nutrition consulted      #DAWOOD - resolved  -Scr 1.5 with unknown baseline   -likely prerenal due to HHS  -resolved with IVF     # HLD  - uncontrolled hypercholesterolemia as well as hypertriglyceridemia with triglycerides at 450  - atorvastatin 40 mg started--> de escalated to 20 mg due to AST ALT in 40s     #Gout   -no signs of flare up  -continue home allopurinol     Diet:  Diabetic       High Risk Conditions:  Pancreatitis     Disposition:    Anticipated 3/15    Signing Physician:     Josy Zhao MD  Department of Hospital Medicine   [facility]  Pager [phone]  Spectra [phone]  3/14/2019